# Patient Record
Sex: MALE | Race: WHITE | NOT HISPANIC OR LATINO | Employment: OTHER | URBAN - METROPOLITAN AREA
[De-identification: names, ages, dates, MRNs, and addresses within clinical notes are randomized per-mention and may not be internally consistent; named-entity substitution may affect disease eponyms.]

---

## 2018-08-09 ENCOUNTER — TELEPHONE (OUTPATIENT)
Dept: INTERNAL MEDICINE | Facility: CLINIC | Age: 83
End: 2018-08-09

## 2018-08-10 ENCOUNTER — LAB REQUISITION (OUTPATIENT)
Dept: LAB | Facility: HOSPITAL | Age: 83
End: 2018-08-10

## 2018-08-10 DIAGNOSIS — Z00.00 ROUTINE GENERAL MEDICAL EXAMINATION AT A HEALTH CARE FACILITY: ICD-10-CM

## 2018-08-10 LAB
ALBUMIN SERPL-MCNC: 4.27 G/DL (ref 3.2–4.8)
ALBUMIN/GLOB SERPL: 1.7 G/DL (ref 1.5–2.5)
ALP SERPL-CCNC: 71 U/L (ref 25–100)
ALT SERPL W P-5'-P-CCNC: 24 U/L (ref 7–40)
ANION GAP SERPL CALCULATED.3IONS-SCNC: 10 MMOL/L (ref 3–11)
AST SERPL-CCNC: 29 U/L (ref 0–33)
BACTERIA UR QL AUTO: NORMAL /HPF
BASOPHILS # BLD AUTO: 0.04 10*3/MM3 (ref 0–0.2)
BASOPHILS NFR BLD AUTO: 0.6 % (ref 0–1)
BILIRUB SERPL-MCNC: 0.4 MG/DL (ref 0.3–1.2)
BILIRUB UR QL STRIP: NEGATIVE
BUN BLD-MCNC: 19 MG/DL (ref 9–23)
BUN/CREAT SERPL: 16.5 (ref 7–25)
CALCIUM SPEC-SCNC: 8.7 MG/DL (ref 8.7–10.4)
CHLORIDE SERPL-SCNC: 104 MMOL/L (ref 99–109)
CLARITY UR: CLEAR
CO2 SERPL-SCNC: 28 MMOL/L (ref 20–31)
COLOR UR: YELLOW
CREAT BLD-MCNC: 1.15 MG/DL (ref 0.6–1.3)
DEPRECATED RDW RBC AUTO: 47.6 FL (ref 37–54)
EOSINOPHIL # BLD AUTO: 0.36 10*3/MM3 (ref 0–0.3)
EOSINOPHIL NFR BLD AUTO: 5.3 % (ref 0–3)
ERYTHROCYTE [DISTWIDTH] IN BLOOD BY AUTOMATED COUNT: 14 % (ref 11.3–14.5)
GFR SERPL CREATININE-BSD FRML MDRD: 60 ML/MIN/1.73
GFR SERPL CREATININE-BSD FRML MDRD: 72 ML/MIN/1.73
GLOBULIN UR ELPH-MCNC: 2.5 GM/DL
GLUCOSE BLD-MCNC: 110 MG/DL (ref 70–100)
GLUCOSE UR STRIP-MCNC: NEGATIVE MG/DL
HCT VFR BLD AUTO: 41.8 % (ref 38.9–50.9)
HGB BLD-MCNC: 13.2 G/DL (ref 13.1–17.5)
HGB UR QL STRIP.AUTO: NEGATIVE
HYALINE CASTS UR QL AUTO: NORMAL /LPF
IMM GRANULOCYTES # BLD: 0.02 10*3/MM3 (ref 0–0.03)
IMM GRANULOCYTES NFR BLD: 0.3 % (ref 0–0.6)
KETONES UR QL STRIP: NEGATIVE
LEUKOCYTE ESTERASE UR QL STRIP.AUTO: NEGATIVE
LYMPHOCYTES # BLD AUTO: 1.84 10*3/MM3 (ref 0.6–4.8)
LYMPHOCYTES NFR BLD AUTO: 27.1 % (ref 24–44)
MCH RBC QN AUTO: 29.5 PG (ref 27–31)
MCHC RBC AUTO-ENTMCNC: 31.6 G/DL (ref 32–36)
MCV RBC AUTO: 93.3 FL (ref 80–99)
MONOCYTES # BLD AUTO: 0.51 10*3/MM3 (ref 0–1)
MONOCYTES NFR BLD AUTO: 7.5 % (ref 0–12)
NEUTROPHILS # BLD AUTO: 4.03 10*3/MM3 (ref 1.5–8.3)
NEUTROPHILS NFR BLD AUTO: 59.5 % (ref 41–71)
NITRITE UR QL STRIP: NEGATIVE
PH UR STRIP.AUTO: 5.5 [PH] (ref 5–8)
PLATELET # BLD AUTO: 202 10*3/MM3 (ref 150–450)
PMV BLD AUTO: 10.4 FL (ref 6–12)
POTASSIUM BLD-SCNC: 4.5 MMOL/L (ref 3.5–5.5)
PROT SERPL-MCNC: 6.8 G/DL (ref 5.7–8.2)
PROT UR QL STRIP: NEGATIVE
RBC # BLD AUTO: 4.48 10*6/MM3 (ref 4.2–5.76)
RBC # UR: NORMAL /HPF
REF LAB TEST METHOD: NORMAL
SODIUM BLD-SCNC: 142 MMOL/L (ref 132–146)
SP GR UR STRIP: >=1.03 (ref 1–1.03)
SQUAMOUS #/AREA URNS HPF: NORMAL /HPF
UROBILINOGEN UR QL STRIP: NORMAL
WBC NRBC COR # BLD: 6.78 10*3/MM3 (ref 3.5–10.8)
WBC UR QL AUTO: NORMAL /HPF

## 2018-08-10 PROCEDURE — 81001 URINALYSIS AUTO W/SCOPE: CPT

## 2018-08-10 PROCEDURE — 80053 COMPREHEN METABOLIC PANEL: CPT

## 2018-08-10 PROCEDURE — 85025 COMPLETE CBC W/AUTO DIFF WBC: CPT

## 2018-08-16 ENCOUNTER — APPOINTMENT (OUTPATIENT)
Dept: GENERAL RADIOLOGY | Facility: HOSPITAL | Age: 83
End: 2018-08-16

## 2018-08-16 ENCOUNTER — APPOINTMENT (OUTPATIENT)
Dept: CT IMAGING | Facility: HOSPITAL | Age: 83
End: 2018-08-16

## 2018-08-16 ENCOUNTER — HOSPITAL ENCOUNTER (EMERGENCY)
Facility: HOSPITAL | Age: 83
Discharge: HOME | End: 2018-08-16
Attending: EMERGENCY MEDICINE | Admitting: EMERGENCY MEDICINE

## 2018-08-16 VITALS
WEIGHT: 190 LBS | SYSTOLIC BLOOD PRESSURE: 153 MMHG | HEIGHT: 72 IN | TEMPERATURE: 97.7 F | DIASTOLIC BLOOD PRESSURE: 68 MMHG | RESPIRATION RATE: 18 BRPM | HEART RATE: 64 BPM | OXYGEN SATURATION: 95 % | BODY MASS INDEX: 25.73 KG/M2

## 2018-08-16 DIAGNOSIS — S00.511A: ICD-10-CM

## 2018-08-16 DIAGNOSIS — S01.21XA LACERATION OF NOSE, INITIAL ENCOUNTER: ICD-10-CM

## 2018-08-16 DIAGNOSIS — S42.475A CLOSED NONDISPLACED TRANSCONDYLAR FRACTURE OF LEFT HUMERUS, INITIAL ENCOUNTER: Primary | ICD-10-CM
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
## 2018-08-16 LAB
BUN BLDA-MCNC: 18 MG/DL (ref 8–26)
CA-I BLDA-SCNC: 1 MMOL/L (ref 1.2–1.32)
CHLORIDE BLDA-SCNC: 103 MMOL/L (ref 98–109)
CO2 BLDA-SCNC: 21 MMOL/L (ref 24–29)
CREAT BLDA-MCNC: 0.6 MG/DL (ref 0.6–1.3)
GLUCOSE BLDC GLUCOMTR-MCNC: 85 MG/DL (ref 70–130)
HCT VFR BLDA CALC: 34 % (ref 38–51)
HGB BLDA-MCNC: 11.6 G/DL (ref 12–17)
POTASSIUM BLDA-SCNC: 3.5 MMOL/L (ref 3.5–4.9)
SODIUM BLDA-SCNC: 141 MMOL/L (ref 138–146)

## 2018-08-16 PROCEDURE — 70486 CT MAXILLOFACIAL W/O DYE: CPT

## 2018-08-16 PROCEDURE — 85014 HEMATOCRIT: CPT

## 2018-08-16 PROCEDURE — 73090 X-RAY EXAM OF FOREARM: CPT

## 2018-08-16 PROCEDURE — 72125 CT NECK SPINE W/O DYE: CPT

## 2018-08-16 PROCEDURE — 80047 BASIC METABLC PNL IONIZED CA: CPT

## 2018-08-16 PROCEDURE — 70450 CT HEAD/BRAIN W/O DYE: CPT

## 2018-08-16 PROCEDURE — 99284 EMERGENCY DEPT VISIT MOD MDM: CPT

## 2018-08-16 PROCEDURE — 73060 X-RAY EXAM OF HUMERUS: CPT

## 2018-08-16 RX ORDER — LORAZEPAM 0.5 MG/1
0.5 TABLET ORAL NIGHTLY PRN
COMMUNITY
End: 2019-02-20

## 2018-08-16 RX ORDER — ACETAMINOPHEN 325 MG/1
650 TABLET ORAL EVERY 4 HOURS PRN
COMMUNITY

## 2018-08-16 RX ORDER — BACITRACIN, NEOMYCIN, POLYMYXIN B 400; 3.5; 5 [USP'U]/G; MG/G; [USP'U]/G
1 OINTMENT TOPICAL ONCE
Status: COMPLETED | OUTPATIENT
Start: 2018-08-16 | End: 2018-08-16

## 2018-08-16 RX ORDER — DOCUSATE SODIUM 100 MG/1
100 CAPSULE, LIQUID FILLED ORAL 2 TIMES DAILY
COMMUNITY
End: 2019-02-20

## 2018-08-16 RX ADMIN — BACITRACIN, NEOMYCIN, POLYMYXIN B 1 G: 400; 3.5; 5 OINTMENT TOPICAL at 20:41

## 2018-08-16 NOTE — ED PROVIDER NOTES
Nafisa Flores is a 89 y.o. male who presents to the ED s/p fall. EMS reports that they were called by the [facility] after the patient fell face first. As a result of this fall he sustained a laceration to the bridge of his nose. EMS also notes that he seemed to be favoring his left upper extremity while en route to the ED. There was no loss of consciousness. The patient has a hx of dementia and is confused and requires assistance to walk at baseline. He was sent to the [facility] by his nursing home due to combative behavior. There are no other acute complaints at this time.        History provided by:  EMS personnel  History limited by:  Dementia  Fall   Mechanism of injury: fall    Injury location:  Face  Facial injury location:  Nose and lower lip  Incident location: [facility].  Arrived directly from scene: yes    Fall:     Fall occurred:  Unable to specify    Impact surface:  Unable to specify    Point of impact:  Face  Suspicion of alcohol use: no    Suspicion of drug use: no    Current pain details:     Pain quality:  Unable to specify    Pain Severity:  Unable to specify    Pain timing:  Constant  Associated symptoms: no loss of consciousness        Review of Systems   Unable to perform ROS: Dementia   Musculoskeletal:        LUE pain   Skin:        Nose laceration and lip abrasion   Neurological: Negative for loss of consciousness.       Past Medical History:   Diagnosis Date   • Dementia        No Known Allergies    History reviewed. No pertinent surgical history.    History reviewed. No pertinent family history.    Social History     Social History   • Marital status:      Social History Main Topics   • Smoking status: Unknown If Ever Smoked   • Alcohol use Defer   • Drug use: Unknown     Other Topics Concern   • Not on file         Objective   Physical Exam   Constitutional: He appears well-developed and well-nourished. No distress.   Silent older male with little spontaneous movements. No  conversation. He follows simple commands and smells of feces.   HENT:   Head: Normocephalic.   Nose: Nose normal.   Airway patent. He has dentures. There is a small laceration to the bridge of his nose with dry blood on his face most likely due to this injury. Small abrasion to his lower lip that is sensitive to touch.    Eyes: Conjunctivae are normal. No scleral icterus.   Pupils are small and equal   Neck:   Patient is in a c collar    Cardiovascular: Regular rhythm and normal heart sounds.  Bradycardia present.    No murmur heard.  Pulses:       Radial pulses are 2+ on the left side.   Pulmonary/Chest: Effort normal and breath sounds normal. No respiratory distress. He exhibits no tenderness.   No chest wall bruising. No obvious rib tenderness.   Abdominal: Soft. There is no tenderness.   Musculoskeletal: He exhibits no tenderness.   No obvious pain to his BLE. There is pain with movement of the LUE but there are no obvious signs of trauma.    Neurological:    are strong. He is slow to follow commands and is not speaking. Cannot evaluate sensory or motor function   Skin: Skin is warm and dry.   Psychiatric: He has a normal mood and affect. His behavior is normal.   Nursing note and vitals reviewed.      Procedures         ED Course  ED Course as of Aug 16 1934   Thu Aug 16, 2018   1708 Paged Dr. Cage, orthopedist.   [DT]   1711 Dr. Snyder discussed the case with Dr. Cage, orthopedist, who says to put the patient in a splint  [DT]      ED Course User Index  [DT] James Cox     Recent Results (from the past 24 hour(s))   POC CHEM 8    Collection Time: 08/16/18  4:56 PM   Result Value Ref Range    Glucose 85 70 - 130 mg/dL    BUN 18 8 - 26 mg/dL    Creatinine 0.60 0.60 - 1.30 mg/dL    Sodium 141 138 - 146 mmol/L    Potassium 3.5 3.5 - 4.9 mmol/L    Chloride 103 98 - 109 mmol/L    Total CO2 21 (L) 24 - 29 mmol/L    Hemoglobin 11.6 (L) 12.0 - 17.0 g/dL    Hematocrit 34 (L) 38 - 51 %    Ionized Calcium 1.00  (L) 1.20 - 1.32 mmol/L     Note: In addition to lab results from this visit, the labs listed above may include labs taken at another facility or during a different encounter within the last 24 hours. Please correlate lab times with ED admission and discharge times for further clarification of the services performed during this visit.    CT Head Without Contrast   Final Result   1. No acute intracranial abnormality.    2. No acute facial bone fracture.   3. No acute cervical spine fracture.        DICTATED:   8/16/2018   EDITED/ls :   8/16/2018        This report was finalized on 8/16/2018 6:56 PM by Eduardo Portillo.          CT Cervical Spine Without Contrast   Final Result   1. No acute intracranial abnormality.    2. No acute facial bone fracture.   3. No acute cervical spine fracture.        DICTATED:   8/16/2018   EDITED/ls :   8/16/2018        This report was finalized on 8/16/2018 6:56 PM by Eduardo Portillo.          CT Facial Bones Without Contrast   Final Result   1. No acute intracranial abnormality.    2. No acute facial bone fracture.   3. No acute cervical spine fracture.        DICTATED:   8/16/2018   EDITED/ls :   8/16/2018        This report was finalized on 8/16/2018 6:56 PM by Eduardo Portillo.          XR Forearm 2 View Left   Final Result   There are degenerative changes of the elbow and a small   metallic radiopaque foreign body is noted in the region of the wrist as   described above.           D:  08/16/2018   E:  08/16/2018       This report was finalized on 8/16/2018 4:59 PM by Dr. Brian Jasmine MD.          XR Humerus Left   Final Result   Intercondylar fracture of the distal humerus without   dislocation or displacement.       D:  08/16/2018   E:  08/16/2018       This report was finalized on 8/16/2018 4:59 PM by Dr. Brian Jasmine MD.            Vitals:    08/16/18 1619 08/16/18 1731 08/16/18 1817 08/16/18 1818   BP: 160/71 153/76 159/58    BP Location: Right arm      Patient Position: Lying      Pulse:  "62 64     Resp: 16 18     Temp: 97.7 °F (36.5 °C)      TempSrc: Axillary      SpO2: 97% 97%  95%   Weight: 86.2 kg (190 lb)      Height: 182.9 cm (72\")        Medications   neomycin-bacitracin-polymyxin (NEOSPORIN) ointment 1 g (not administered)     ECG/EMG Results (last 24 hours)     ** No results found for the last 24 hours. **                        [facility]    Final diagnoses:   Closed nondisplaced transcondylar fracture of left humerus, initial encounter   Laceration of nose, initial encounter   Abrasion of vermilion border of lower lip, initial encounter   Fall, initial encounter       Documentation assistance provided by efe Cox.  Information recorded by the scribe was done at my direction and has been verified and validated by me.     James Cox  08/16/18 1650       James Cox  08/16/18 1715       James Cox  08/16/18 1928       James Cox  08/1934       Castillo Snyder MD  08/17/18 0205    "

## 2018-08-16 NOTE — DISCHARGE INSTRUCTIONS
Follow with Dr. Cage at the next available time. Call and make an appointment    Apply antibiotic ointment to lip and bridge of nose twice daily    Continue behavioral treatment at the [facility]    Continue to avoid falls    Immediately return to the emergency department for any new or worsening symptoms.

## 2018-08-31 ENCOUNTER — OFFICE VISIT (OUTPATIENT)
Dept: ORTHOPEDIC SURGERY | Facility: CLINIC | Age: 83
End: 2018-08-31

## 2018-08-31 VITALS — WEIGHT: 190 LBS | HEIGHT: 72 IN | HEART RATE: 82 BPM | BODY MASS INDEX: 25.73 KG/M2 | OXYGEN SATURATION: 98 %

## 2018-08-31 DIAGNOSIS — F02.81 ALZHEIMER'S DEMENTIA WITH BEHAVIORAL DISTURBANCE, UNSPECIFIED TIMING OF DEMENTIA ONSET: ICD-10-CM

## 2018-08-31 DIAGNOSIS — G30.9 ALZHEIMER'S DEMENTIA WITH BEHAVIORAL DISTURBANCE, UNSPECIFIED TIMING OF DEMENTIA ONSET: ICD-10-CM

## 2018-08-31 DIAGNOSIS — S42.422A CLOSED DISPLACED COMMINUTED SUPRACONDYLAR FRACTURE OF LEFT HUMERUS WITHOUT INTERCONDYLAR FRACTURE, INITIAL ENCOUNTER: ICD-10-CM

## 2018-08-31 DIAGNOSIS — M25.522 LEFT ELBOW PAIN: Primary | ICD-10-CM

## 2018-08-31 PROCEDURE — 99204 OFFICE O/P NEW MOD 45 MIN: CPT | Performed by: ORTHOPAEDIC SURGERY

## 2018-08-31 PROCEDURE — 24530 CLTX SPRCNDYLR HUMERAL FX WO: CPT | Performed by: ORTHOPAEDIC SURGERY

## 2018-08-31 RX ORDER — METOPROLOL TARTRATE 50 MG/1
50 TABLET, FILM COATED ORAL DAILY
Status: ON HOLD | COMMUNITY
End: 2019-03-23 | Stop reason: SDUPTHER

## 2018-08-31 RX ORDER — SERTRALINE HYDROCHLORIDE 25 MG/1
25 TABLET, FILM COATED ORAL DAILY
COMMUNITY

## 2018-08-31 RX ORDER — AMLODIPINE BESYLATE 5 MG/1
5 TABLET ORAL DAILY
COMMUNITY
End: 2019-03-23 | Stop reason: HOSPADM

## 2018-08-31 RX ORDER — SIMVASTATIN 20 MG
20 TABLET ORAL EVERY MORNING
COMMUNITY

## 2018-08-31 RX ORDER — DONEPEZIL HYDROCHLORIDE 10 MG/1
10 TABLET, FILM COATED ORAL NIGHTLY
COMMUNITY

## 2018-08-31 RX ORDER — ASPIRIN 81 MG/1
81 TABLET ORAL DAILY
COMMUNITY

## 2018-09-28 ENCOUNTER — OFFICE VISIT (OUTPATIENT)
Dept: ORTHOPEDIC SURGERY | Facility: CLINIC | Age: 83
End: 2018-09-28

## 2018-09-28 VITALS — HEART RATE: 82 BPM | WEIGHT: 190.04 LBS | OXYGEN SATURATION: 82 % | BODY MASS INDEX: 25.74 KG/M2 | HEIGHT: 72 IN

## 2018-09-28 DIAGNOSIS — F02.81 ALZHEIMER'S DEMENTIA WITH BEHAVIORAL DISTURBANCE, UNSPECIFIED TIMING OF DEMENTIA ONSET: ICD-10-CM

## 2018-09-28 DIAGNOSIS — G30.9 ALZHEIMER'S DEMENTIA WITH BEHAVIORAL DISTURBANCE, UNSPECIFIED TIMING OF DEMENTIA ONSET: ICD-10-CM

## 2018-09-28 DIAGNOSIS — Z09 FRACTURE FOLLOW-UP: Primary | ICD-10-CM

## 2018-09-28 DIAGNOSIS — S42.422D CLOSED DISPLACED COMMINUTED SUPRACONDYLAR FRACTURE OF LEFT HUMERUS WITHOUT INTERCONDYLAR FRACTURE WITH ROUTINE HEALING, SUBSEQUENT ENCOUNTER: ICD-10-CM

## 2018-09-28 PROCEDURE — 99024 POSTOP FOLLOW-UP VISIT: CPT | Performed by: ORTHOPAEDIC SURGERY

## 2018-09-28 RX ORDER — LOSARTAN POTASSIUM 50 MG/1
TABLET ORAL
COMMUNITY
Start: 2018-09-18 | End: 2019-02-20

## 2018-09-28 NOTE — PROGRESS NOTES
[facility] Note    Subjective     Chief Complaint   Patient presents with   • Left Elbow - Follow-up     1  Month, Closed displaced comminuted supracondylar fracture of left humerus without intercondylar fracture        HPI      Tee Flores is a 89 y.o. male.  He is follow-up left elbow fracture from over 6 weeks ago.  He has severe Alzheimer's dementia.  He's here with a caregiver.  He came out of the splint today.  History was obtained from his caregiver as he is severely demented and unsure as to why he is here.        Past Medical History:   Diagnosis Date   • Alzheimer disease    • Atherosclerosis of coronary artery bypass graft    • Cerebral infarction (CMS/HCC)    • Dementia    • Hyperlipidemia    • Hypertension       History reviewed. No pertinent surgical history.   History reviewed. No pertinent family history.  Social History     Social History   • Marital status:      Spouse name: N/A   • Number of children: N/A   • Years of education: N/A     Occupational History   • Not on file.     Social History Main Topics   • Smoking status: Former Smoker     Packs/day: 2.00   • Smokeless tobacco: Never Used   • Alcohol use No   • Drug use: No   • Sexual activity: Defer     Other Topics Concern   • Not on file     Social History Narrative   • No narrative on file      Current Outpatient Prescriptions on File Prior to Visit   Medication Sig Dispense Refill   • acetaminophen (TYLENOL) 325 MG tablet Take 650 mg by mouth Every 4 (Four) Hours As Needed for Mild Pain .     • amLODIPine (NORVASC) 5 MG tablet Take 5 mg by mouth Daily.     • aspirin 81 MG EC tablet Take 81 mg by mouth Daily.     • bismuth subsalicylate (PEPTO BISMOL) 262 MG/15ML suspension Take 30 mL by mouth Every 4 (Four) Hours As Needed for Indigestion.     • docusate sodium (COLACE) 100 MG capsule Take 100 mg by mouth 2 (Two) Times a Day.     • donepezil (ARICEPT) 10 MG tablet Take 10 mg by mouth Every Night.     •  "LORazepam (ATIVAN) 0.5 MG tablet Take 0.5 mg by mouth At Night As Needed for Anxiety.     • magnesium hydroxide (MILK OF MAGNESIA) 2400 MG/10ML suspension suspension Take 10 mL by mouth Daily As Needed.     • metoprolol tartrate (LOPRESSOR) 50 MG tablet Take 50 mg by mouth Daily.     • sertraline (ZOLOFT) 25 MG tablet Take 25 mg by mouth As Needed.     • simvastatin (ZOCOR) 20 MG tablet Take 20 mg by mouth Daily.       No current facility-administered medications on file prior to visit.       No Known Allergies     The following portions of the patient's history were reviewed and updated as appropriate: allergies, current medications, past family history, past medical history, past social history, past surgical history and problem list.    Review of Systems   Constitutional: Negative.    HENT: Negative.    Eyes: Negative.    Respiratory: Negative.    Cardiovascular: Negative.    Gastrointestinal: Negative.    Endocrine: Negative.    Genitourinary: Negative.    Musculoskeletal: Positive for arthralgias.   Skin: Negative.    Allergic/Immunologic: Negative.    Neurological: Negative.    Hematological: Negative.    Psychiatric/Behavioral: Negative.         Objective      Physical Exam  Pulse 82   Ht 182.9 cm (72.01\")   Wt 86.2 kg (190 lb 0.6 oz)   SpO2 (!) 82%   BMI 25.77 kg/m²     Body mass index is 25.77 kg/m².        GENERAL APPEARANCE: awake, alert & disoriented, in no acute distress and well developed, well nourished  PSYCH: normal mood and affect        Ortho Exam  His left elbow skin is intact.  He is neurovascular intact.  He is quite stiff with limited range of motion.  His range of motion is from 45-95°.    Imaging/Studies  Imaging Results (last 7 days)     Procedure Component Value Units Date/Time    XR Elbow 3+ View Left [[id number]] Resulted:  09/28/18 1115     Updated:  09/28/18 1115    Narrative:       Left Elbow X-Ray  Indication: Pain  Views: AP, oblique and Lateral views    Findings:  Healing of " supracondylar humerus fracture  No bony lesion  Normal soft tissues  Normal joint spaces    prior studies were available for comparison.                  Assessment/Plan        ICD-10-CM ICD-9-CM   1. Fracture follow-up Z09 V67.4   2. Closed displaced comminuted supracondylar fracture of left humerus without intercondylar fracture with routine healing, subsequent encounter S42.422D V54.11   3. Alzheimer's dementia with behavioral disturbance, unspecified timing of dementia onset G30.8 331.0    F02.81 294.11     I wrote for physical therapy for him to do range of motion left elbow.  He'll follow-up month with x-rays.  He is here with his caregiver.  Medical Decision Making  Management Options : over-the-counter medicine, physical/occupational therapy and close treatment of fracture or dislocation  Data/Risk: radiology tests, decision to obtain history from someone other than the patient and independent visualization of imaging, lab tests, or EMG/NCV    Cornell Cage MD  09/28/18  11:19 AM         EMR Dragon/Transcription disclaimer:  Much of this encounter note is an electronic transcription of spoken language to printed text. Electronic transcription of spoken language may permit erroneous, or at times, nonsensical words or phrases to be inadvertently transcribed. Although I have reviewed the note for such errors, some may still exist.

## 2018-10-05 ENCOUNTER — TELEPHONE (OUTPATIENT)
Dept: INTERNAL MEDICINE | Facility: CLINIC | Age: 83
End: 2018-10-05

## 2018-10-26 ENCOUNTER — OFFICE VISIT (OUTPATIENT)
Dept: ORTHOPEDIC SURGERY | Facility: CLINIC | Age: 83
End: 2018-10-26

## 2018-10-26 VITALS — OXYGEN SATURATION: 98 % | HEART RATE: 50 BPM | BODY MASS INDEX: 25.74 KG/M2 | WEIGHT: 190.04 LBS | HEIGHT: 72 IN

## 2018-10-26 DIAGNOSIS — Z09 FRACTURE FOLLOW-UP: Primary | ICD-10-CM

## 2018-10-26 DIAGNOSIS — S42.422D CLOSED DISPLACED COMMINUTED SUPRACONDYLAR FRACTURE OF LEFT HUMERUS WITHOUT INTERCONDYLAR FRACTURE WITH ROUTINE HEALING, SUBSEQUENT ENCOUNTER: ICD-10-CM

## 2018-10-26 PROCEDURE — 99024 POSTOP FOLLOW-UP VISIT: CPT | Performed by: ORTHOPAEDIC SURGERY

## 2018-10-26 RX ORDER — RISPERIDONE 0.25 MG/1
0.25 TABLET ORAL 2 TIMES DAILY
COMMUNITY
Start: 2018-09-28 | End: 2019-03-01 | Stop reason: HOSPADM

## 2018-10-26 NOTE — PROGRESS NOTES
[facility]    Subjective     Chief Complaint   Patient presents with   • Left Elbow - Follow-up     1  Month, Closed displaced comminuted supracondylar fracture of left humerus without intercondylar fracture        HPI      Tee Flores is a 89 y.o. male.  He has severe Alzheimer's dementia and is not able to lyse here.  He has no complaints.  He is here with a caregiver.  He is seated in a wheelchair.        Past Medical History:   Diagnosis Date   • Alzheimer disease    • Atherosclerosis of coronary artery bypass graft    • Cerebral infarction (CMS/HCC)    • Dementia    • Hyperlipidemia    • Hypertension       History reviewed. No pertinent surgical history.   Family History   Problem Relation Age of Onset   • Family history unknown: Yes     Social History     Social History   • Marital status:      Spouse name: N/A   • Number of children: N/A   • Years of education: N/A     Occupational History   • Not on file.     Social History Main Topics   • Smoking status: Former Smoker     Packs/day: 2.00   • Smokeless tobacco: Never Used   • Alcohol use No   • Drug use: No   • Sexual activity: Defer     Other Topics Concern   • Not on file     Social History Narrative   • No narrative on file      Current Outpatient Prescriptions on File Prior to Visit   Medication Sig Dispense Refill   • acetaminophen (TYLENOL) 325 MG tablet Take 650 mg by mouth Every 4 (Four) Hours As Needed for Mild Pain .     • amLODIPine (NORVASC) 5 MG tablet Take 5 mg by mouth Daily.     • aspirin 81 MG EC tablet Take 81 mg by mouth Daily.     • bismuth subsalicylate (PEPTO BISMOL) 262 MG/15ML suspension Take 30 mL by mouth Every 4 (Four) Hours As Needed for Indigestion.     • cimetidine (TAGAMET) 200 MG tablet      • docusate sodium (COLACE) 100 MG capsule Take 100 mg by mouth 2 (Two) Times a Day.     • donepezil (ARICEPT) 10 MG tablet Take 10 mg by mouth Every Night.     • LORazepam (ATIVAN) 0.5 MG tablet  "Take 0.5 mg by mouth At Night As Needed for Anxiety.     • losartan (COZAAR) 50 MG tablet      • magnesium hydroxide (MILK OF MAGNESIA) 2400 MG/10ML suspension suspension Take 10 mL by mouth Daily As Needed.     • metoprolol tartrate (LOPRESSOR) 50 MG tablet Take 50 mg by mouth Daily.     • sertraline (ZOLOFT) 25 MG tablet Take 25 mg by mouth As Needed.     • simvastatin (ZOCOR) 20 MG tablet Take 20 mg by mouth Daily.       No current facility-administered medications on file prior to visit.       No Known Allergies     The following portions of the patient's history were reviewed and updated as appropriate: allergies, current medications, past family history, past medical history, past social history, past surgical history and problem list.    Review of Systems   Constitutional: Negative.    HENT: Negative.    Eyes: Negative.    Respiratory: Negative.    Cardiovascular: Negative.    Gastrointestinal: Negative.    Endocrine: Negative.    Genitourinary: Negative.    Musculoskeletal: Positive for arthralgias.   Skin: Negative.    Allergic/Immunologic: Negative.    Neurological: Negative.    Hematological: Negative.    Psychiatric/Behavioral: Negative.         Objective      Physical Exam  Pulse 50   Ht 182.9 cm (72.01\")   Wt 86.2 kg (190 lb 0.6 oz)   SpO2 98%   BMI 25.77 kg/m²     Body mass index is 25.77 kg/m².        GENERAL APPEARANCE: awake, alert & oriented x 3, in no acute distress and well developed, well nourished  PSYCH: normal mood and affect  LUNGS:  breathing nonlabored, no wheezing  EYES: sclera anicteric, pupils equal  CARDIOVASCULAR: palpable pulses dorsalis pedis, palpable posterior tibial bilaterally. Capillary refill less than 2 seconds  INTEGUMENTARY: skin intact, no clubbing, cyanosis  NEUROLOGIC:  Normal Sensation and reflexes             Ortho Exam  His left elbow has no pain or tenderness.    Imaging/Studies  Imaging Results (last 7 days)     Procedure Component Value Units Date/Time    XR " Elbow 3+ View Left [[id number]] Resulted:  10/26/18 1143     Updated:  10/26/18 1144    Narrative:       Left Elbow X-Ray  Indication: Pain  Views: AP, oblique and Lateral views    Findings:  Healing of left elbow fracture with slight malunion  No bony lesion  Normal soft tissues  Normal joint spaces     prior studies were available for comparison.                  Assessment/Plan        ICD-10-CM ICD-9-CM   1. Fracture follow-up Z09 V67.4   2. Closed displaced comminuted supracondylar fracture of left humerus without intercondylar fracture with routine healing, subsequent encounter S42.422D V54.11       Orders Placed This Encounter   Procedures   • XR Elbow 3+ View Left    He is activity as tolerated.  He will follow-up as needed.    Medical Decision Making  Management Options : over-the-counter medicine      Cornell Cage MD  10/26/18  11:46 AM         EMR Dragon/Transcription disclaimer:  Much of this encounter note is an electronic transcription of spoken language to printed text. Electronic transcription of spoken language may permit erroneous, or at times, nonsensical words or phrases to be inadvertently transcribed. Although I have reviewed the note for such errors, some may still exist.

## 2018-11-12 ENCOUNTER — TELEPHONE (OUTPATIENT)
Dept: INTERNAL MEDICINE | Facility: CLINIC | Age: 83
End: 2018-11-12

## 2018-11-14 ENCOUNTER — TELEPHONE (OUTPATIENT)
Dept: INTERNAL MEDICINE | Facility: CLINIC | Age: 83
End: 2018-11-14

## 2019-02-09 ENCOUNTER — HOSPITAL ENCOUNTER (EMERGENCY)
Facility: HOSPITAL | Age: 84
Discharge: NURSING, REHAB OR LONG-TERM CARE | End: 2019-02-09
Attending: EMERGENCY MEDICINE | Admitting: EMERGENCY MEDICINE

## 2019-02-09 ENCOUNTER — APPOINTMENT (OUTPATIENT)
Dept: CT IMAGING | Facility: HOSPITAL | Age: 84
End: 2019-02-09

## 2019-02-09 VITALS
HEIGHT: 72 IN | TEMPERATURE: 97.7 F | SYSTOLIC BLOOD PRESSURE: 137 MMHG | HEART RATE: 47 BPM | RESPIRATION RATE: 16 BRPM | OXYGEN SATURATION: 95 % | DIASTOLIC BLOOD PRESSURE: 57 MMHG | BODY MASS INDEX: 23.46 KG/M2 | WEIGHT: 173.2 LBS

## 2019-02-09 DIAGNOSIS — M54.50 ACUTE MIDLINE LOW BACK PAIN WITHOUT SCIATICA: Primary | ICD-10-CM

## 2019-02-09 PROCEDURE — 72128 CT CHEST SPINE W/O DYE: CPT

## 2019-02-09 PROCEDURE — 72131 CT LUMBAR SPINE W/O DYE: CPT

## 2019-02-09 PROCEDURE — 72125 CT NECK SPINE W/O DYE: CPT

## 2019-02-09 PROCEDURE — 99283 EMERGENCY DEPT VISIT LOW MDM: CPT

## 2019-02-09 RX ORDER — ERGOCALCIFEROL 1.25 MG/1
50000 CAPSULE ORAL WEEKLY
COMMUNITY

## 2019-02-09 NOTE — ED PROVIDER NOTES
Subjective   Tee Flores is a 89 y.o.male with a history of Alzheimerwho presents to the ED via EMS with complaints of back pain s/p fall. EMS reported that the patient fell 2 days ago and received X rays that were unremarkable. They state that he is now complaining of severe back pain. However, when the patient was asked if he has back pain or any other back pain he denies it. The patient intermittently speaks incomprehensibly and his history and review of systems are not possible secondary to his dementia.        History provided by:  Patient and EMS personnel  History limited by:  Dementia  Back Pain   Location:  Lumbar spine  Quality:  Unable to specify  Pain severity:  Unable to specify  Pain is:  Unable to specify  Onset quality:  Sudden  Duration:  2 days  Timing:  Unable to specify  Progression:  Unable to specify  Chronicity:  New  Context: falling        Review of Systems   Unable to perform ROS: Dementia   Musculoskeletal: Positive for back pain.       Past Medical History:   Diagnosis Date   • Alzheimer disease    • Atherosclerosis of coronary artery bypass graft    • Cerebral infarction (CMS/HCC)    • Dementia    • Hyperlipidemia    • Hypertension        No Known Allergies    History reviewed. No pertinent surgical history.    Family History   Family history unknown: Yes       Social History     Socioeconomic History   • Marital status:      Spouse name: Not on file   • Number of children: Not on file   • Years of education: Not on file   • Highest education level: Not on file   Tobacco Use   • Smoking status: Former Smoker     Packs/day: 2.00   • Smokeless tobacco: Never Used   Substance and Sexual Activity   • Alcohol use: No   • Drug use: No   • Sexual activity: Defer         Objective   Physical Exam   Constitutional: He appears well-developed and well-nourished. No distress.   HENT:   Head: Normocephalic and atraumatic.   Nose: Nose normal.   Eyes: Conjunctivae are normal. No scleral  icterus.   Neck: Normal range of motion. Neck supple.   Cardiovascular: Normal rate, regular rhythm and normal heart sounds.   No murmur heard.  Pulmonary/Chest: Effort normal and breath sounds normal. No respiratory distress.   Abdominal: Soft. There is no tenderness.   Musculoskeletal: Normal range of motion. He exhibits no edema or tenderness.   He has no back tenderness and no spasms or deformity. He has normal range of motion of the legs without pain in the hips or knees. He has no neck tenderness.  Scalp nontender.   Neurological: He is alert.   Moves all fours.  No apparent weakness.   Skin: Skin is warm and dry.   Nursing note and vitals reviewed.      Procedures         ED Course     CT of the entire spine is negative for acute process.  Pt has no complaints on rechecks.  Family arrived, discussed with them, they were given same story we were of patient yelling about back pain.  He has no evidence of discomfort here.                 MDM  Number of Diagnoses or Management Options  Acute midline low back pain without sciatica:      Amount and/or Complexity of Data Reviewed  Tests in the radiology section of CPT®: reviewed and ordered  Independent visualization of images, tracings, or specimens: yes        Final diagnoses:   Acute midline low back pain without sciatica       Documentation assistance provided by efe Carter.  Information recorded by the efe was done at my direction and has been verified and validated by me.     Gerber Carter  02/09/19 1236       Gerber Carter  02/09/19 1506       Quentin Harmon MD  02/09/19 [id number]

## 2019-02-20 ENCOUNTER — APPOINTMENT (OUTPATIENT)
Dept: CT IMAGING | Facility: HOSPITAL | Age: 84
End: 2019-02-20

## 2019-02-20 ENCOUNTER — APPOINTMENT (OUTPATIENT)
Dept: GENERAL RADIOLOGY | Facility: HOSPITAL | Age: 84
End: 2019-02-20

## 2019-02-20 ENCOUNTER — ANESTHESIA (OUTPATIENT)
Dept: PERIOP | Facility: HOSPITAL | Age: 84
End: 2019-02-20

## 2019-02-20 ENCOUNTER — HOSPITAL ENCOUNTER (INPATIENT)
Facility: HOSPITAL | Age: 84
LOS: 9 days | Discharge: NURSING, REHAB OR LONG-TERM CARE | End: 2019-03-01
Attending: EMERGENCY MEDICINE | Admitting: ORTHOPAEDIC SURGERY

## 2019-02-20 ENCOUNTER — ANESTHESIA EVENT (OUTPATIENT)
Dept: PERIOP | Facility: HOSPITAL | Age: 84
End: 2019-02-20

## 2019-02-20 ENCOUNTER — ANESTHESIA EVENT (OUTPATIENT)
Dept: EMERGENCY DEPT | Facility: HOSPITAL | Age: 84
End: 2019-02-20

## 2019-02-20 ENCOUNTER — ANESTHESIA (OUTPATIENT)
Dept: EMERGENCY DEPT | Facility: HOSPITAL | Age: 84
End: 2019-02-20

## 2019-02-20 DIAGNOSIS — S72.002A CLOSED FRACTURE OF LEFT HIP, INITIAL ENCOUNTER (HCC): Primary | ICD-10-CM

## 2019-02-20 DIAGNOSIS — R13.11 ORAL PHASE DYSPHAGIA: ICD-10-CM

## 2019-02-20 DIAGNOSIS — Z78.9 IMPAIRED MOBILITY AND ADLS: ICD-10-CM

## 2019-02-20 DIAGNOSIS — Z74.09 IMPAIRED MOBILITY AND ADLS: ICD-10-CM

## 2019-02-20 DIAGNOSIS — Z74.09 IMPAIRED FUNCTIONAL MOBILITY, BALANCE, GAIT, AND ENDURANCE: ICD-10-CM

## 2019-02-20 PROBLEM — I10 HYPERTENSION: Status: ACTIVE | Noted: 2019-02-20

## 2019-02-20 PROBLEM — F03.90 DEMENTIA (HCC): Status: ACTIVE | Noted: 2019-02-20

## 2019-02-20 LAB
ABO GROUP BLD: NORMAL
ABO GROUP BLD: NORMAL
ALBUMIN SERPL-MCNC: 4.46 G/DL (ref 3.2–4.8)
ALBUMIN/GLOB SERPL: 1.7 G/DL (ref 1.5–2.5)
ALP SERPL-CCNC: 91 U/L (ref 25–100)
ALT SERPL W P-5'-P-CCNC: 17 U/L (ref 7–40)
ANION GAP SERPL CALCULATED.3IONS-SCNC: 7 MMOL/L (ref 3–11)
AST SERPL-CCNC: 22 U/L (ref 0–33)
BACTERIA UR QL AUTO: ABNORMAL /HPF
BASOPHILS # BLD AUTO: 0.02 10*3/MM3 (ref 0–0.2)
BASOPHILS NFR BLD AUTO: 0.2 % (ref 0–1)
BILIRUB SERPL-MCNC: 1.1 MG/DL (ref 0.3–1.2)
BILIRUB UR QL STRIP: NEGATIVE
BLD GP AB SCN SERPL QL: NEGATIVE
BUN BLD-MCNC: 15 MG/DL (ref 9–23)
BUN/CREAT SERPL: 14.4 (ref 7–25)
CALCIUM SPEC-SCNC: 9.2 MG/DL (ref 8.7–10.4)
CHLORIDE SERPL-SCNC: 105 MMOL/L (ref 99–109)
CLARITY UR: CLEAR
CO2 SERPL-SCNC: 26 MMOL/L (ref 20–31)
COLOR UR: YELLOW
CREAT BLD-MCNC: 1.04 MG/DL (ref 0.6–1.3)
DEPRECATED RDW RBC AUTO: 48.4 FL (ref 37–54)
EOSINOPHIL # BLD AUTO: 0.34 10*3/MM3 (ref 0–0.3)
EOSINOPHIL NFR BLD AUTO: 3 % (ref 0–3)
ERYTHROCYTE [DISTWIDTH] IN BLOOD BY AUTOMATED COUNT: 13.9 % (ref 11.3–14.5)
GFR SERPL CREATININE-BSD FRML MDRD: 67 ML/MIN/1.73
GLOBULIN UR ELPH-MCNC: 2.6 GM/DL
GLUCOSE BLD-MCNC: 123 MG/DL (ref 70–100)
GLUCOSE UR STRIP-MCNC: NEGATIVE MG/DL
HCT VFR BLD AUTO: 41.8 % (ref 38.9–50.9)
HGB BLD-MCNC: 13.3 G/DL (ref 13.1–17.5)
HGB UR QL STRIP.AUTO: ABNORMAL
HOLD SPECIMEN: NORMAL
HOLD SPECIMEN: NORMAL
HYALINE CASTS UR QL AUTO: ABNORMAL /LPF
IMM GRANULOCYTES # BLD AUTO: 0.02 10*3/MM3 (ref 0–0.05)
IMM GRANULOCYTES NFR BLD AUTO: 0.2 % (ref 0–0.6)
INR PPP: 1.07 (ref 0.85–1.16)
KETONES UR QL STRIP: NEGATIVE
LEUKOCYTE ESTERASE UR QL STRIP.AUTO: ABNORMAL
LYMPHOCYTES # BLD AUTO: 1.46 10*3/MM3 (ref 0.6–4.8)
LYMPHOCYTES NFR BLD AUTO: 12.7 % (ref 24–44)
MCH RBC QN AUTO: 30.5 PG (ref 27–31)
MCHC RBC AUTO-ENTMCNC: 31.8 G/DL (ref 32–36)
MCV RBC AUTO: 95.9 FL (ref 80–99)
MONOCYTES # BLD AUTO: 0.6 10*3/MM3 (ref 0–1)
MONOCYTES NFR BLD AUTO: 5.2 % (ref 0–12)
NEUTROPHILS # BLD AUTO: 9.1 10*3/MM3 (ref 1.5–8.3)
NEUTROPHILS NFR BLD AUTO: 78.9 % (ref 41–71)
NITRITE UR QL STRIP: POSITIVE
PH UR STRIP.AUTO: 6 [PH] (ref 5–8)
PLATELET # BLD AUTO: 197 10*3/MM3 (ref 150–450)
PMV BLD AUTO: 10 FL (ref 6–12)
POTASSIUM BLD-SCNC: 3.9 MMOL/L (ref 3.5–5.5)
PROT SERPL-MCNC: 7.1 G/DL (ref 5.7–8.2)
PROT UR QL STRIP: ABNORMAL
PROTHROMBIN TIME: 13.4 SECONDS (ref 11.2–14.3)
RBC # BLD AUTO: 4.36 10*6/MM3 (ref 4.2–5.76)
RBC # UR: ABNORMAL /HPF
REF LAB TEST METHOD: ABNORMAL
RH BLD: POSITIVE
RH BLD: POSITIVE
SODIUM BLD-SCNC: 138 MMOL/L (ref 132–146)
SP GR UR STRIP: 1.02 (ref 1–1.03)
SQUAMOUS #/AREA URNS HPF: ABNORMAL /HPF
T&S EXPIRATION DATE: NORMAL
TROPONIN I SERPL-MCNC: 0.03 NG/ML (ref 0–0.07)
UROBILINOGEN UR QL STRIP: ABNORMAL
WBC NRBC COR # BLD: 11.52 10*3/MM3 (ref 3.5–10.8)
WBC UR QL AUTO: ABNORMAL /HPF
WHOLE BLOOD HOLD SPECIMEN: NORMAL
WHOLE BLOOD HOLD SPECIMEN: NORMAL

## 2019-02-20 PROCEDURE — 86900 BLOOD TYPING SEROLOGIC ABO: CPT | Performed by: EMERGENCY MEDICINE

## 2019-02-20 PROCEDURE — C1776 JOINT DEVICE (IMPLANTABLE): HCPCS | Performed by: ORTHOPAEDIC SURGERY

## 2019-02-20 PROCEDURE — 93005 ELECTROCARDIOGRAM TRACING: CPT | Performed by: EMERGENCY MEDICINE

## 2019-02-20 PROCEDURE — 25010000002 FENTANYL CITRATE (PF) 100 MCG/2ML SOLUTION: Performed by: NURSE ANESTHETIST, CERTIFIED REGISTERED

## 2019-02-20 PROCEDURE — 87077 CULTURE AEROBIC IDENTIFY: CPT | Performed by: EMERGENCY MEDICINE

## 2019-02-20 PROCEDURE — 86901 BLOOD TYPING SEROLOGIC RH(D): CPT | Performed by: EMERGENCY MEDICINE

## 2019-02-20 PROCEDURE — 87086 URINE CULTURE/COLONY COUNT: CPT | Performed by: EMERGENCY MEDICINE

## 2019-02-20 PROCEDURE — 70450 CT HEAD/BRAIN W/O DYE: CPT

## 2019-02-20 PROCEDURE — 86850 RBC ANTIBODY SCREEN: CPT | Performed by: EMERGENCY MEDICINE

## 2019-02-20 PROCEDURE — 73502 X-RAY EXAM HIP UNI 2-3 VIEWS: CPT

## 2019-02-20 PROCEDURE — 81001 URINALYSIS AUTO W/SCOPE: CPT | Performed by: EMERGENCY MEDICINE

## 2019-02-20 PROCEDURE — 25010000002 ROPIVACAINE PER 1 MG: Performed by: NURSE ANESTHETIST, CERTIFIED REGISTERED

## 2019-02-20 PROCEDURE — 86900 BLOOD TYPING SEROLOGIC ABO: CPT

## 2019-02-20 PROCEDURE — 99223 1ST HOSP IP/OBS HIGH 75: CPT | Performed by: INTERNAL MEDICINE

## 2019-02-20 PROCEDURE — 84484 ASSAY OF TROPONIN QUANT: CPT

## 2019-02-20 PROCEDURE — 99284 EMERGENCY DEPT VISIT MOD MDM: CPT

## 2019-02-20 PROCEDURE — 87147 CULTURE TYPE IMMUNOLOGIC: CPT | Performed by: EMERGENCY MEDICINE

## 2019-02-20 PROCEDURE — 0SRS01A REPLACEMENT OF LEFT HIP JOINT, FEMORAL SURFACE WITH METAL SYNTHETIC SUBSTITUTE, UNCEMENTED, OPEN APPROACH: ICD-10-PCS | Performed by: ORTHOPAEDIC SURGERY

## 2019-02-20 PROCEDURE — 73501 X-RAY EXAM HIP UNI 1 VIEW: CPT

## 2019-02-20 PROCEDURE — 25010000002 VANCOMYCIN 1 G RECONSTITUTED SOLUTION: Performed by: NURSE ANESTHETIST, CERTIFIED REGISTERED

## 2019-02-20 PROCEDURE — 72192 CT PELVIS W/O DYE: CPT

## 2019-02-20 PROCEDURE — 85025 COMPLETE CBC W/AUTO DIFF WBC: CPT | Performed by: EMERGENCY MEDICINE

## 2019-02-20 PROCEDURE — 25010000002 PROPOFOL 10 MG/ML EMULSION: Performed by: NURSE ANESTHETIST, CERTIFIED REGISTERED

## 2019-02-20 PROCEDURE — 71045 X-RAY EXAM CHEST 1 VIEW: CPT

## 2019-02-20 PROCEDURE — 80053 COMPREHEN METABOLIC PANEL: CPT | Performed by: EMERGENCY MEDICINE

## 2019-02-20 PROCEDURE — 86901 BLOOD TYPING SEROLOGIC RH(D): CPT

## 2019-02-20 PROCEDURE — 87186 SC STD MICRODIL/AGAR DIL: CPT | Performed by: EMERGENCY MEDICINE

## 2019-02-20 PROCEDURE — 85610 PROTHROMBIN TIME: CPT | Performed by: EMERGENCY MEDICINE

## 2019-02-20 DEVICE — SUMMIT FEMORAL STEM 12/14 TAPER TAPER ED W/POROCOAT SIZE 7 HI 155MM
Type: IMPLANTABLE DEVICE | Site: HIP | Status: FUNCTIONAL
Brand: SUMMIT POROCOAT

## 2019-02-20 DEVICE — PRT HIP BIPOL PRIM DEPUY 9525109/9525107: Type: IMPLANTABLE DEVICE | Site: HIP | Status: FUNCTIONAL

## 2019-02-20 DEVICE — ARTICUL/EZE FEMORAL HEAD DIAMETER 28MM +5 12/14 TAPER
Type: IMPLANTABLE DEVICE | Site: HIP | Status: FUNCTIONAL
Brand: ARTICUL/EZE

## 2019-02-20 DEVICE — SELF CENTERING BI-POLAR HEAD 28MM ID 54MM OD
Type: IMPLANTABLE DEVICE | Site: HIP | Status: FUNCTIONAL
Brand: SELF CENTERING

## 2019-02-20 RX ORDER — METOPROLOL TARTRATE 5 MG/5ML
2.5 INJECTION INTRAVENOUS ONCE
Status: COMPLETED | OUTPATIENT
Start: 2019-02-20 | End: 2019-02-20

## 2019-02-20 RX ORDER — ROPIVACAINE HYDROCHLORIDE 5 MG/ML
INJECTION, SOLUTION EPIDURAL; INFILTRATION; PERINEURAL
Status: COMPLETED | OUTPATIENT
Start: 2019-02-20 | End: 2019-02-20

## 2019-02-20 RX ORDER — MAGNESIUM HYDROXIDE 1200 MG/15ML
LIQUID ORAL AS NEEDED
Status: DISCONTINUED | OUTPATIENT
Start: 2019-02-20 | End: 2019-02-20 | Stop reason: HOSPADM

## 2019-02-20 RX ORDER — VANCOMYCIN HYDROCHLORIDE 1 G/20ML
INJECTION, POWDER, LYOPHILIZED, FOR SOLUTION INTRAVENOUS AS NEEDED
Status: DISCONTINUED | OUTPATIENT
Start: 2019-02-20 | End: 2019-02-20 | Stop reason: SURG

## 2019-02-20 RX ORDER — SODIUM CHLORIDE 0.9 % (FLUSH) 0.9 %
3 SYRINGE (ML) INJECTION EVERY 12 HOURS SCHEDULED
Status: DISCONTINUED | OUTPATIENT
Start: 2019-02-20 | End: 2019-03-01 | Stop reason: HOSPADM

## 2019-02-20 RX ORDER — NEOSTIGMINE METHYLSULFATE 5 MG/5 ML
SYRINGE (ML) INTRAVENOUS AS NEEDED
Status: DISCONTINUED | OUTPATIENT
Start: 2019-02-20 | End: 2019-02-20 | Stop reason: SURG

## 2019-02-20 RX ORDER — FAMOTIDINE 20 MG/1
20 TABLET, FILM COATED ORAL EVERY 12 HOURS SCHEDULED
Status: DISCONTINUED | OUTPATIENT
Start: 2019-02-20 | End: 2019-02-20 | Stop reason: HOSPADM

## 2019-02-20 RX ORDER — SODIUM CHLORIDE, SODIUM LACTATE, POTASSIUM CHLORIDE, CALCIUM CHLORIDE 600; 310; 30; 20 MG/100ML; MG/100ML; MG/100ML; MG/100ML
INJECTION, SOLUTION INTRAVENOUS CONTINUOUS PRN
Status: DISCONTINUED | OUTPATIENT
Start: 2019-02-20 | End: 2019-02-20 | Stop reason: SURG

## 2019-02-20 RX ORDER — LORAZEPAM 0.5 MG/1
0.5 TABLET ORAL NIGHTLY PRN
Status: DISCONTINUED | OUTPATIENT
Start: 2019-02-20 | End: 2019-03-01 | Stop reason: HOSPADM

## 2019-02-20 RX ORDER — METOPROLOL TARTRATE 50 MG/1
50 TABLET, FILM COATED ORAL DAILY
Status: DISCONTINUED | OUTPATIENT
Start: 2019-02-21 | End: 2019-03-01 | Stop reason: HOSPADM

## 2019-02-20 RX ORDER — HEPARIN SODIUM 5000 [USP'U]/ML
5000 INJECTION, SOLUTION INTRAVENOUS; SUBCUTANEOUS EVERY 12 HOURS SCHEDULED
Status: DISCONTINUED | OUTPATIENT
Start: 2019-02-21 | End: 2019-03-01 | Stop reason: HOSPADM

## 2019-02-20 RX ORDER — GLYCOPYRROLATE 0.2 MG/ML
INJECTION INTRAMUSCULAR; INTRAVENOUS AS NEEDED
Status: DISCONTINUED | OUTPATIENT
Start: 2019-02-20 | End: 2019-02-20 | Stop reason: SURG

## 2019-02-20 RX ORDER — DOCUSATE SODIUM 100 MG/1
100 CAPSULE, LIQUID FILLED ORAL DAILY PRN
Status: DISCONTINUED | OUTPATIENT
Start: 2019-02-20 | End: 2019-03-01 | Stop reason: HOSPADM

## 2019-02-20 RX ORDER — ACETAMINOPHEN 325 MG/1
650 TABLET ORAL EVERY 6 HOURS SCHEDULED
Status: DISCONTINUED | OUTPATIENT
Start: 2019-02-20 | End: 2019-03-01 | Stop reason: HOSPADM

## 2019-02-20 RX ORDER — SODIUM CHLORIDE 0.9 % (FLUSH) 0.9 %
3-10 SYRINGE (ML) INJECTION AS NEEDED
Status: DISCONTINUED | OUTPATIENT
Start: 2019-02-20 | End: 2019-02-20 | Stop reason: HOSPADM

## 2019-02-20 RX ORDER — SODIUM CHLORIDE 0.9 % (FLUSH) 0.9 %
3 SYRINGE (ML) INJECTION EVERY 12 HOURS SCHEDULED
Status: DISCONTINUED | OUTPATIENT
Start: 2019-02-20 | End: 2019-02-20 | Stop reason: HOSPADM

## 2019-02-20 RX ORDER — FENTANYL CITRATE 50 UG/ML
INJECTION, SOLUTION INTRAMUSCULAR; INTRAVENOUS AS NEEDED
Status: DISCONTINUED | OUTPATIENT
Start: 2019-02-20 | End: 2019-02-20 | Stop reason: SURG

## 2019-02-20 RX ORDER — ROPIVACAINE HYDROCHLORIDE 2 MG/ML
8 INJECTION, SOLUTION EPIDURAL; INFILTRATION CONTINUOUS
Status: DISPENSED | OUTPATIENT
Start: 2019-02-20 | End: 2019-02-25

## 2019-02-20 RX ORDER — FAMOTIDINE 20 MG/1
20 TABLET, FILM COATED ORAL
Status: DISCONTINUED | OUTPATIENT
Start: 2019-02-20 | End: 2019-02-20 | Stop reason: HOSPADM

## 2019-02-20 RX ORDER — SODIUM CHLORIDE 0.9 % (FLUSH) 0.9 %
3-10 SYRINGE (ML) INJECTION AS NEEDED
Status: DISCONTINUED | OUTPATIENT
Start: 2019-02-20 | End: 2019-03-01 | Stop reason: HOSPADM

## 2019-02-20 RX ORDER — CEFAZOLIN SODIUM 2 G/100ML
2 INJECTION, SOLUTION INTRAVENOUS EVERY 8 HOURS
Status: DISCONTINUED | OUTPATIENT
Start: 2019-02-21 | End: 2019-02-21

## 2019-02-20 RX ORDER — LOSARTAN POTASSIUM 25 MG/1
50 TABLET ORAL
Status: DISCONTINUED | OUTPATIENT
Start: 2019-02-21 | End: 2019-03-01 | Stop reason: HOSPADM

## 2019-02-20 RX ORDER — ATORVASTATIN CALCIUM 10 MG/1
10 TABLET, FILM COATED ORAL DAILY
Status: DISCONTINUED | OUTPATIENT
Start: 2019-02-21 | End: 2019-03-01 | Stop reason: HOSPADM

## 2019-02-20 RX ORDER — ATRACURIUM BESYLATE 10 MG/ML
INJECTION, SOLUTION INTRAVENOUS AS NEEDED
Status: DISCONTINUED | OUTPATIENT
Start: 2019-02-20 | End: 2019-02-20 | Stop reason: SURG

## 2019-02-20 RX ORDER — VANCOMYCIN HYDROCHLORIDE 1 G/200ML
1000 INJECTION, SOLUTION INTRAVENOUS ONCE
Status: DISCONTINUED | OUTPATIENT
Start: 2019-02-20 | End: 2019-02-21

## 2019-02-20 RX ORDER — LIDOCAINE HYDROCHLORIDE 10 MG/ML
INJECTION, SOLUTION INFILTRATION; PERINEURAL AS NEEDED
Status: DISCONTINUED | OUTPATIENT
Start: 2019-02-20 | End: 2019-02-20 | Stop reason: SURG

## 2019-02-20 RX ORDER — ACETAMINOPHEN 325 MG/1
650 TABLET ORAL EVERY 4 HOURS PRN
Status: DISCONTINUED | OUTPATIENT
Start: 2019-02-20 | End: 2019-02-21

## 2019-02-20 RX ORDER — SODIUM CHLORIDE, SODIUM LACTATE, POTASSIUM CHLORIDE, CALCIUM CHLORIDE 600; 310; 30; 20 MG/100ML; MG/100ML; MG/100ML; MG/100ML
9 INJECTION, SOLUTION INTRAVENOUS CONTINUOUS PRN
Status: DISCONTINUED | OUTPATIENT
Start: 2019-02-20 | End: 2019-02-20 | Stop reason: HOSPADM

## 2019-02-20 RX ORDER — ASPIRIN 81 MG/1
81 TABLET ORAL DAILY
Status: DISCONTINUED | OUTPATIENT
Start: 2019-02-21 | End: 2019-03-01 | Stop reason: HOSPADM

## 2019-02-20 RX ORDER — SODIUM CHLORIDE 0.9 % (FLUSH) 0.9 %
10 SYRINGE (ML) INJECTION AS NEEDED
Status: DISCONTINUED | OUTPATIENT
Start: 2019-02-20 | End: 2019-02-20

## 2019-02-20 RX ORDER — PROPOFOL 10 MG/ML
VIAL (ML) INTRAVENOUS AS NEEDED
Status: DISCONTINUED | OUTPATIENT
Start: 2019-02-20 | End: 2019-02-20 | Stop reason: SURG

## 2019-02-20 RX ORDER — DONEPEZIL HYDROCHLORIDE 10 MG/1
10 TABLET, FILM COATED ORAL NIGHTLY
Status: DISCONTINUED | OUTPATIENT
Start: 2019-02-20 | End: 2019-03-01 | Stop reason: HOSPADM

## 2019-02-20 RX ORDER — AMLODIPINE BESYLATE 5 MG/1
5 TABLET ORAL DAILY
Status: DISCONTINUED | OUTPATIENT
Start: 2019-02-21 | End: 2019-03-01 | Stop reason: HOSPADM

## 2019-02-20 RX ORDER — CEFAZOLIN SODIUM 2 G/100ML
2 INJECTION, SOLUTION INTRAVENOUS ONCE
Status: DISCONTINUED | OUTPATIENT
Start: 2019-02-20 | End: 2019-02-20 | Stop reason: HOSPADM

## 2019-02-20 RX ADMIN — DONEPEZIL HYDROCHLORIDE 10 MG: 10 TABLET, FILM COATED ORAL at 23:21

## 2019-02-20 RX ADMIN — ATRACURIUM BESYLATE 10 MG: 10 INJECTION, SOLUTION INTRAVENOUS at 17:40

## 2019-02-20 RX ADMIN — VANCOMYCIN HYDROCHLORIDE 1 G: 1 INJECTION, POWDER, LYOPHILIZED, FOR SOLUTION INTRAVENOUS at 17:31

## 2019-02-20 RX ADMIN — ACETAMINOPHEN 650 MG: 325 TABLET ORAL at 23:21

## 2019-02-20 RX ADMIN — FENTANYL CITRATE 100 MCG: 50 INJECTION, SOLUTION INTRAMUSCULAR; INTRAVENOUS at 17:17

## 2019-02-20 RX ADMIN — PROPOFOL 20 MG: 10 INJECTION, EMULSION INTRAVENOUS at 17:40

## 2019-02-20 RX ADMIN — METOPROLOL TARTRATE 2.5 MG: 5 INJECTION INTRAVENOUS at 19:24

## 2019-02-20 RX ADMIN — SODIUM CHLORIDE, POTASSIUM CHLORIDE, SODIUM LACTATE AND CALCIUM CHLORIDE 9 ML/HR: 600; 310; 30; 20 INJECTION, SOLUTION INTRAVENOUS at 16:28

## 2019-02-20 RX ADMIN — SODIUM CHLORIDE, POTASSIUM CHLORIDE, SODIUM LACTATE AND CALCIUM CHLORIDE: 600; 310; 30; 20 INJECTION, SOLUTION INTRAVENOUS at 17:07

## 2019-02-20 RX ADMIN — ATRACURIUM BESYLATE 20 MG: 10 INJECTION, SOLUTION INTRAVENOUS at 17:07

## 2019-02-20 RX ADMIN — Medication 3 MG: at 18:35

## 2019-02-20 RX ADMIN — FAMOTIDINE 20 MG: 10 INJECTION, SOLUTION INTRAVENOUS at 16:25

## 2019-02-20 RX ADMIN — ROPIVACAINE HYDROCHLORIDE 8 ML/HR: 2 INJECTION, SOLUTION EPIDURAL; INFILTRATION at 14:00

## 2019-02-20 RX ADMIN — GLYCOPYRROLATE 0.4 MG: 0.2 INJECTION, SOLUTION INTRAMUSCULAR; INTRAVENOUS at 18:35

## 2019-02-20 RX ADMIN — ROPIVACAINE HYDROCHLORIDE 30 ML: 5 INJECTION, SOLUTION EPIDURAL; INFILTRATION; PERINEURAL at 13:20

## 2019-02-20 RX ADMIN — PROPOFOL 40 MG: 10 INJECTION, EMULSION INTRAVENOUS at 17:07

## 2019-02-20 RX ADMIN — LIDOCAINE HYDROCHLORIDE 50 MG: 10 INJECTION, SOLUTION INFILTRATION; PERINEURAL at 17:07

## 2019-02-20 NOTE — ANESTHESIA PROCEDURE NOTES
Peripheral Block      Patient location during procedure: pre-op  Reason for block: procedure for pain and at surgeon's request  Performed by  CRNA: Sam Iglesias, CRNA  Assisted by: Jane Oliveira RN  Preanesthetic Checklist  Completed: patient identified, site marked, surgical consent, pre-op evaluation, timeout performed, IV checked, risks and benefits discussed and monitors and equipment checked  Prep:  Sterile barriers:cap, gloves and mask  Prep: ChloraPrep  Patient monitoring: blood pressure monitoring, continuous pulse oximetry and EKG  Procedure    Guidance:ultrasound guided  Images:still images obtained    Laterality:left  Block Type:fascia iliaca catheter  Injection Technique:catheter  Needle Type:echogenic  Needle Gauge:18 G    Catheter Size:20 G (20g)  Cath Depth at skin: 20 cm  Medications Used: ropivacaine (NAROPIN) 0.5 % injection, 30 mL  Med admintered at 2/20/2019 1:20 PM  Medications  Preservative Free Saline:10ml  Comment:30ml NS added.    Post Assessment  Injection Assessment: negative aspiration for heme, no paresthesia on injection and incremental injection  Patient Tolerance:comfortable throughout block  Complications:no  Additional Notes  Procedure:                 Pt placed in supine position.   The insertion site was prepped in sterile fashion with Chlorapreop and clear plastic drapes.  Analgesia was provided by skin infiltration at insertion site with Lidocaine 1% 3mls.  A B-Mclaughlin 18 g , 4 inch echogenic Touhy needle was advance In-plane under ultrasound guidance. The   Anterior superior Iliac crest was initially visualized and the probe was directed slightly medially and slightly towards the umbilicus.  The course of the needle was tracked over the sartorius muscle through the fascia Iliacus and into the anterior portion of the Iliacus muscle.  Major vessels where identified and avoided as where structures of the peritoneal cavity.  LA injection was made incrementally in 1-5ml amounts  spread was visualized superiorly below fascia iliacus.  Injection was completed with negative aspiration of blood and negative intravascular injection.  Injection pressures where normal or minimal resistance.  A 20 g B-Mclaughlin wire styleted catheter was then advance thru the needle and very easily placed in a superior or cephalad direction.  The catheter was secured at insertion site with skin afix , mastisol, steristreps.  A CHG tegaderm dressing was placed over the insertion site and the nerve catheter labeled and capped.  Thank You.

## 2019-02-20 NOTE — ANESTHESIA PROCEDURE NOTES
Airway  Urgency: elective    Airway not difficult    General Information and Staff    Patient location during procedure: OR    Indications and Patient Condition  Indications for airway management: airway protection    Preoxygenated: yes  MILS not maintained throughout  Mask difficulty assessment: 1 - vent by mask    Final Airway Details  Final airway type: endotracheal airway      Successful airway: ETT  Cuffed: yes   Successful intubation technique: direct laryngoscopy  Endotracheal tube insertion site: oral  Blade: Monica  Blade size: 3  ETT size (mm): 7.0  Cormack-Lehane Classification: grade I - full view of glottis  Placement verified by: chest auscultation and capnometry   Measured from: lips  ETT to lips (cm): 20  Number of attempts at approach: 1    Additional Comments  Negative epigastric sounds, Breath sound equal bilaterally with symmetric chest rise and fall

## 2019-02-20 NOTE — ANESTHESIA PREPROCEDURE EVALUATION
Anesthesia Evaluation     Patient summary reviewed and Nursing notes reviewed   no history of anesthetic complications:  NPO Solid Status: > 8 hours  NPO Liquid Status: > 8 hours           Airway   Mallampati: II  TM distance: >3 FB  Neck ROM: full  No difficulty expected  Dental      Pulmonary - negative pulmonary ROS and normal exam   Cardiovascular - normal exam    (+) hypertension, CAD, hyperlipidemia,       Neuro/Psych  (+) psychiatric history, dementia,     GI/Hepatic/Renal/Endo      Musculoskeletal     Abdominal    Substance History      OB/GYN          Other                        Anesthesia Plan    ASA 3     general with block     intravenous induction   Anesthetic plan, all risks, benefits, and alternatives have been provided, discussed and informed consent has been obtained with: patient.    Plan discussed with CRNA.

## 2019-02-21 PROBLEM — N39.0 ACUTE UTI (URINARY TRACT INFECTION): Status: ACTIVE | Noted: 2019-02-21

## 2019-02-21 PROCEDURE — 25010000003 CEFAZOLIN IN DEXTROSE 2-4 GM/100ML-% SOLUTION: Performed by: ORTHOPAEDIC SURGERY

## 2019-02-21 PROCEDURE — 25010000002 ROPIVACAINE PER 1 MG: Performed by: NURSE ANESTHETIST, CERTIFIED REGISTERED

## 2019-02-21 PROCEDURE — 87040 BLOOD CULTURE FOR BACTERIA: CPT | Performed by: INTERNAL MEDICINE

## 2019-02-21 PROCEDURE — 97167 OT EVAL HIGH COMPLEX 60 MIN: CPT | Performed by: OCCUPATIONAL THERAPIST

## 2019-02-21 PROCEDURE — 25010000002 VANCOMYCIN: Performed by: INTERNAL MEDICINE

## 2019-02-21 PROCEDURE — 25010000002 HEPARIN (PORCINE) PER 1000 UNITS: Performed by: ORTHOPAEDIC SURGERY

## 2019-02-21 PROCEDURE — 99232 SBSQ HOSP IP/OBS MODERATE 35: CPT | Performed by: INTERNAL MEDICINE

## 2019-02-21 PROCEDURE — 97163 PT EVAL HIGH COMPLEX 45 MIN: CPT

## 2019-02-21 PROCEDURE — 25010000002 CEFTRIAXONE PER 250 MG: Performed by: INTERNAL MEDICINE

## 2019-02-21 RX ORDER — CEFTRIAXONE SODIUM 1 G/50ML
1 INJECTION, SOLUTION INTRAVENOUS EVERY 24 HOURS
Status: DISCONTINUED | OUTPATIENT
Start: 2019-02-21 | End: 2019-02-21

## 2019-02-21 RX ORDER — CEFTRIAXONE SODIUM 1 G/50ML
1 INJECTION, SOLUTION INTRAVENOUS EVERY 24 HOURS
Status: DISCONTINUED | OUTPATIENT
Start: 2019-02-21 | End: 2019-02-22

## 2019-02-21 RX ADMIN — SODIUM CHLORIDE, PRESERVATIVE FREE 3 ML: 5 INJECTION INTRAVENOUS at 10:29

## 2019-02-21 RX ADMIN — CEFAZOLIN SODIUM 2 G: 2 INJECTION, SOLUTION INTRAVENOUS at 02:13

## 2019-02-21 RX ADMIN — VANCOMYCIN HYDROCHLORIDE 1250 MG: 10 INJECTION, POWDER, LYOPHILIZED, FOR SOLUTION INTRAVENOUS at 17:16

## 2019-02-21 RX ADMIN — ROPIVACAINE HYDROCHLORIDE 8 ML/HR: 2 INJECTION, SOLUTION EPIDURAL; INFILTRATION at 13:13

## 2019-02-21 RX ADMIN — ATORVASTATIN CALCIUM 10 MG: 10 TABLET, FILM COATED ORAL at 10:29

## 2019-02-21 RX ADMIN — LOSARTAN POTASSIUM 50 MG: 25 TABLET, FILM COATED ORAL at 09:55

## 2019-02-21 RX ADMIN — METOPROLOL TARTRATE 50 MG: 50 TABLET ORAL at 09:55

## 2019-02-21 RX ADMIN — HEPARIN SODIUM 5000 UNITS: 5000 INJECTION INTRAVENOUS; SUBCUTANEOUS at 09:55

## 2019-02-21 RX ADMIN — SERTRALINE HYDROCHLORIDE 25 MG: 50 TABLET ORAL at 09:55

## 2019-02-21 RX ADMIN — DONEPEZIL HYDROCHLORIDE 10 MG: 10 TABLET, FILM COATED ORAL at 21:29

## 2019-02-21 RX ADMIN — ACETAMINOPHEN 650 MG: 325 TABLET ORAL at 13:03

## 2019-02-21 RX ADMIN — HEPARIN SODIUM 5000 UNITS: 5000 INJECTION INTRAVENOUS; SUBCUTANEOUS at 21:30

## 2019-02-21 RX ADMIN — ACETAMINOPHEN 650 MG: 325 TABLET ORAL at 05:52

## 2019-02-21 RX ADMIN — ACETAMINOPHEN 650 MG: 325 TABLET ORAL at 17:14

## 2019-02-21 RX ADMIN — ACETAMINOPHEN 650 MG: 325 TABLET ORAL at 23:53

## 2019-02-21 RX ADMIN — ASPIRIN 81 MG: 81 TABLET, COATED ORAL at 09:55

## 2019-02-21 RX ADMIN — CEFTRIAXONE SODIUM 1 G: 1 INJECTION, SOLUTION INTRAVENOUS at 13:04

## 2019-02-21 NOTE — ANESTHESIA POSTPROCEDURE EVALUATION
Patient: Tee Flores    Procedure Summary     Date:  02/20/19 [facility]    Anesthesia Start:  1707 Anesthesia Stop:  1903    Procedure:  HIP HEMIARTHROPLASTY (Left Hip) Diagnosis:  Closed displaced fracture of left femoral neck (CMS/HCC)    Surgeon:  Mina Baez MD Provider:  Jhonny Patton MD    Anesthesia Type:  general with block ASA Status:  3          Anesthesia Type: general with block  Last vitals  BP   140/52 (02/20/19 1621)   Temp   100.1 °F (37.8 °C) (02/20/19 1621)   Pulse   90 (02/20/19 1621)   Resp   16 (02/20/19 1621)     SpO2   94 % (02/20/19 1621)     Post Anesthesia Care and Evaluation    Patient location during evaluation: PACU  Patient participation: complete - patient participated  Level of consciousness: awake and alert  Pain score: 0  Pain management: adequate  Airway patency: patent  Anesthetic complications: No anesthetic complications  PONV Status: none  Cardiovascular status: hemodynamically stable and acceptable  Respiratory status: nonlabored ventilation, acceptable and nasal cannula  Hydration status: acceptable

## 2019-02-22 LAB
ANION GAP SERPL CALCULATED.3IONS-SCNC: 7 MMOL/L (ref 3–11)
BACTERIA SPEC AEROBE CULT: ABNORMAL
BASOPHILS # BLD AUTO: 0.01 10*3/MM3 (ref 0–0.2)
BASOPHILS NFR BLD AUTO: 0.1 % (ref 0–1)
BUN BLD-MCNC: 17 MG/DL (ref 9–23)
BUN/CREAT SERPL: 21.3 (ref 7–25)
CALCIUM SPEC-SCNC: 7.9 MG/DL (ref 8.7–10.4)
CHLORIDE SERPL-SCNC: 103 MMOL/L (ref 99–109)
CO2 SERPL-SCNC: 25 MMOL/L (ref 20–31)
CREAT BLD-MCNC: 0.8 MG/DL (ref 0.6–1.3)
DEPRECATED RDW RBC AUTO: 50.2 FL (ref 37–54)
EOSINOPHIL # BLD AUTO: 0.38 10*3/MM3 (ref 0–0.3)
EOSINOPHIL NFR BLD AUTO: 5.2 % (ref 0–3)
ERYTHROCYTE [DISTWIDTH] IN BLOOD BY AUTOMATED COUNT: 14.4 % (ref 11.3–14.5)
GFR SERPL CREATININE-BSD FRML MDRD: 91 ML/MIN/1.73
GLUCOSE BLD-MCNC: 102 MG/DL (ref 70–100)
HCT VFR BLD AUTO: 25.3 % (ref 38.9–50.9)
HGB BLD-MCNC: 8 G/DL (ref 13.1–17.5)
IMM GRANULOCYTES # BLD AUTO: 0.02 10*3/MM3 (ref 0–0.05)
IMM GRANULOCYTES NFR BLD AUTO: 0.3 % (ref 0–0.6)
LYMPHOCYTES # BLD AUTO: 1.21 10*3/MM3 (ref 0.6–4.8)
LYMPHOCYTES NFR BLD AUTO: 16.6 % (ref 24–44)
MCH RBC QN AUTO: 30.4 PG (ref 27–31)
MCHC RBC AUTO-ENTMCNC: 31.6 G/DL (ref 32–36)
MCV RBC AUTO: 96.2 FL (ref 80–99)
MONOCYTES # BLD AUTO: 0.68 10*3/MM3 (ref 0–1)
MONOCYTES NFR BLD AUTO: 9.4 % (ref 0–12)
NEUTROPHILS # BLD AUTO: 4.99 10*3/MM3 (ref 1.5–8.3)
NEUTROPHILS NFR BLD AUTO: 68.7 % (ref 41–71)
PLATELET # BLD AUTO: 150 10*3/MM3 (ref 150–450)
PMV BLD AUTO: 10 FL (ref 6–12)
POTASSIUM BLD-SCNC: 3.9 MMOL/L (ref 3.5–5.5)
RBC # BLD AUTO: 2.63 10*6/MM3 (ref 4.2–5.76)
SODIUM BLD-SCNC: 135 MMOL/L (ref 132–146)
VANCOMYCIN SERPL-MCNC: 8 MCG/ML (ref 5–40)
WBC NRBC COR # BLD: 7.27 10*3/MM3 (ref 3.5–10.8)

## 2019-02-22 PROCEDURE — 25010000002 VANCOMYCIN 10 G RECONSTITUTED SOLUTION

## 2019-02-22 PROCEDURE — 85025 COMPLETE CBC W/AUTO DIFF WBC: CPT | Performed by: INTERNAL MEDICINE

## 2019-02-22 PROCEDURE — 80202 ASSAY OF VANCOMYCIN: CPT

## 2019-02-22 PROCEDURE — 25010000002 CEFTRIAXONE PER 250 MG: Performed by: INTERNAL MEDICINE

## 2019-02-22 PROCEDURE — 80048 BASIC METABOLIC PNL TOTAL CA: CPT

## 2019-02-22 PROCEDURE — 97530 THERAPEUTIC ACTIVITIES: CPT

## 2019-02-22 PROCEDURE — 25010000002 ROPIVACAINE PER 1 MG: Performed by: NURSE ANESTHETIST, CERTIFIED REGISTERED

## 2019-02-22 PROCEDURE — 25010000002 HEPARIN (PORCINE) PER 1000 UNITS: Performed by: ORTHOPAEDIC SURGERY

## 2019-02-22 PROCEDURE — 99232 SBSQ HOSP IP/OBS MODERATE 35: CPT | Performed by: INTERNAL MEDICINE

## 2019-02-22 RX ADMIN — CEFTRIAXONE SODIUM 1 G: 1 INJECTION, SOLUTION INTRAVENOUS at 10:16

## 2019-02-22 RX ADMIN — SODIUM CHLORIDE, PRESERVATIVE FREE 3 ML: 5 INJECTION INTRAVENOUS at 20:15

## 2019-02-22 RX ADMIN — ACETAMINOPHEN 650 MG: 325 TABLET ORAL at 05:37

## 2019-02-22 RX ADMIN — ACETAMINOPHEN 650 MG: 325 TABLET ORAL at 11:57

## 2019-02-22 RX ADMIN — VANCOMYCIN HYDROCHLORIDE 1250 MG: 10 INJECTION, POWDER, LYOPHILIZED, FOR SOLUTION INTRAVENOUS at 14:29

## 2019-02-22 RX ADMIN — ROPIVACAINE HYDROCHLORIDE 8 ML/HR: 2 INJECTION, SOLUTION EPIDURAL; INFILTRATION at 11:57

## 2019-02-22 RX ADMIN — AMLODIPINE BESYLATE 5 MG: 5 TABLET ORAL at 08:57

## 2019-02-22 RX ADMIN — LOSARTAN POTASSIUM 50 MG: 25 TABLET, FILM COATED ORAL at 08:57

## 2019-02-22 RX ADMIN — SODIUM CHLORIDE, PRESERVATIVE FREE 3 ML: 5 INJECTION INTRAVENOUS at 08:56

## 2019-02-22 RX ADMIN — METOPROLOL TARTRATE 50 MG: 50 TABLET ORAL at 08:57

## 2019-02-22 RX ADMIN — HEPARIN SODIUM 5000 UNITS: 5000 INJECTION INTRAVENOUS; SUBCUTANEOUS at 08:56

## 2019-02-22 RX ADMIN — ACETAMINOPHEN 650 MG: 325 TABLET ORAL at 17:53

## 2019-02-22 RX ADMIN — POLYETHYLENE GLYCOL 3350 17 G: 17 POWDER, FOR SOLUTION ORAL at 10:24

## 2019-02-22 RX ADMIN — ATORVASTATIN CALCIUM 10 MG: 10 TABLET, FILM COATED ORAL at 08:57

## 2019-02-22 RX ADMIN — DONEPEZIL HYDROCHLORIDE 10 MG: 10 TABLET, FILM COATED ORAL at 20:15

## 2019-02-22 RX ADMIN — SERTRALINE HYDROCHLORIDE 25 MG: 50 TABLET ORAL at 08:56

## 2019-02-22 RX ADMIN — HEPARIN SODIUM 5000 UNITS: 5000 INJECTION INTRAVENOUS; SUBCUTANEOUS at 20:15

## 2019-02-22 RX ADMIN — ASPIRIN 81 MG: 81 TABLET, COATED ORAL at 08:57

## 2019-02-23 LAB
HCT VFR BLD AUTO: 26.4 % (ref 38.9–50.9)
HGB BLD-MCNC: 8.4 G/DL (ref 13.1–17.5)

## 2019-02-23 PROCEDURE — 85014 HEMATOCRIT: CPT | Performed by: INTERNAL MEDICINE

## 2019-02-23 PROCEDURE — 99232 SBSQ HOSP IP/OBS MODERATE 35: CPT | Performed by: INTERNAL MEDICINE

## 2019-02-23 PROCEDURE — 85018 HEMOGLOBIN: CPT | Performed by: INTERNAL MEDICINE

## 2019-02-23 PROCEDURE — 25010000002 HEPARIN (PORCINE) PER 1000 UNITS: Performed by: ORTHOPAEDIC SURGERY

## 2019-02-23 PROCEDURE — 25010000002 VANCOMYCIN 10 G RECONSTITUTED SOLUTION

## 2019-02-23 PROCEDURE — 25010000002 ONDANSETRON PER 1 MG: Performed by: INTERNAL MEDICINE

## 2019-02-23 RX ORDER — ONDANSETRON 2 MG/ML
4 INJECTION INTRAMUSCULAR; INTRAVENOUS EVERY 6 HOURS PRN
Status: DISCONTINUED | OUTPATIENT
Start: 2019-02-23 | End: 2019-03-01 | Stop reason: HOSPADM

## 2019-02-23 RX ADMIN — ACETAMINOPHEN 650 MG: 325 TABLET ORAL at 00:27

## 2019-02-23 RX ADMIN — HEPARIN SODIUM 5000 UNITS: 5000 INJECTION INTRAVENOUS; SUBCUTANEOUS at 08:55

## 2019-02-23 RX ADMIN — SODIUM CHLORIDE, PRESERVATIVE FREE 3 ML: 5 INJECTION INTRAVENOUS at 08:55

## 2019-02-23 RX ADMIN — ACETAMINOPHEN 650 MG: 325 TABLET ORAL at 17:40

## 2019-02-23 RX ADMIN — ATORVASTATIN CALCIUM 10 MG: 10 TABLET, FILM COATED ORAL at 08:55

## 2019-02-23 RX ADMIN — METOPROLOL TARTRATE 50 MG: 50 TABLET ORAL at 08:57

## 2019-02-23 RX ADMIN — ONDANSETRON 4 MG: 2 INJECTION INTRAMUSCULAR; INTRAVENOUS at 09:57

## 2019-02-23 RX ADMIN — ACETAMINOPHEN 650 MG: 325 TABLET ORAL at 06:14

## 2019-02-23 RX ADMIN — DONEPEZIL HYDROCHLORIDE 10 MG: 10 TABLET, FILM COATED ORAL at 20:11

## 2019-02-23 RX ADMIN — HEPARIN SODIUM 5000 UNITS: 5000 INJECTION INTRAVENOUS; SUBCUTANEOUS at 20:10

## 2019-02-23 RX ADMIN — ONDANSETRON 4 MG: 2 INJECTION INTRAMUSCULAR; INTRAVENOUS at 16:17

## 2019-02-23 RX ADMIN — ASPIRIN 81 MG: 81 TABLET, COATED ORAL at 08:55

## 2019-02-23 RX ADMIN — VANCOMYCIN HYDROCHLORIDE 1250 MG: 10 INJECTION, POWDER, LYOPHILIZED, FOR SOLUTION INTRAVENOUS at 14:12

## 2019-02-23 RX ADMIN — SERTRALINE HYDROCHLORIDE 25 MG: 50 TABLET ORAL at 08:58

## 2019-02-23 RX ADMIN — LOSARTAN POTASSIUM 50 MG: 25 TABLET, FILM COATED ORAL at 08:57

## 2019-02-23 RX ADMIN — AMLODIPINE BESYLATE 5 MG: 5 TABLET ORAL at 06:14

## 2019-02-24 LAB
ANION GAP SERPL CALCULATED.3IONS-SCNC: 9 MMOL/L (ref 3–11)
BUN BLD-MCNC: 19 MG/DL (ref 9–23)
BUN/CREAT SERPL: 23.8 (ref 7–25)
CALCIUM SPEC-SCNC: 7.7 MG/DL (ref 8.7–10.4)
CHLORIDE SERPL-SCNC: 105 MMOL/L (ref 99–109)
CO2 SERPL-SCNC: 25 MMOL/L (ref 20–31)
CREAT BLD-MCNC: 0.8 MG/DL (ref 0.6–1.3)
GFR SERPL CREATININE-BSD FRML MDRD: 91 ML/MIN/1.73
GLUCOSE BLD-MCNC: 104 MG/DL (ref 70–100)
POTASSIUM BLD-SCNC: 4.2 MMOL/L (ref 3.5–5.5)
SODIUM BLD-SCNC: 139 MMOL/L (ref 132–146)
VANCOMYCIN TROUGH SERPL-MCNC: 10.5 MCG/ML (ref 10–20)

## 2019-02-24 PROCEDURE — 80202 ASSAY OF VANCOMYCIN: CPT

## 2019-02-24 PROCEDURE — 25010000002 HEPARIN (PORCINE) PER 1000 UNITS: Performed by: ORTHOPAEDIC SURGERY

## 2019-02-24 PROCEDURE — 25010000002 VANCOMYCIN 10 G RECONSTITUTED SOLUTION

## 2019-02-24 PROCEDURE — 80048 BASIC METABOLIC PNL TOTAL CA: CPT | Performed by: INTERNAL MEDICINE

## 2019-02-24 PROCEDURE — 99232 SBSQ HOSP IP/OBS MODERATE 35: CPT | Performed by: INTERNAL MEDICINE

## 2019-02-24 RX ADMIN — ACETAMINOPHEN 650 MG: 325 TABLET ORAL at 00:34

## 2019-02-24 RX ADMIN — HEPARIN SODIUM 5000 UNITS: 5000 INJECTION INTRAVENOUS; SUBCUTANEOUS at 20:23

## 2019-02-24 RX ADMIN — SODIUM CHLORIDE, PRESERVATIVE FREE 3 ML: 5 INJECTION INTRAVENOUS at 09:17

## 2019-02-24 RX ADMIN — HEPARIN SODIUM 5000 UNITS: 5000 INJECTION INTRAVENOUS; SUBCUTANEOUS at 09:17

## 2019-02-24 RX ADMIN — DOCUSATE SODIUM 100 MG: 100 CAPSULE, LIQUID FILLED ORAL at 09:16

## 2019-02-24 RX ADMIN — ACETAMINOPHEN 650 MG: 325 TABLET ORAL at 11:58

## 2019-02-24 RX ADMIN — AMLODIPINE BESYLATE 5 MG: 5 TABLET ORAL at 09:16

## 2019-02-24 RX ADMIN — ASPIRIN 81 MG: 81 TABLET, COATED ORAL at 09:16

## 2019-02-24 RX ADMIN — SODIUM CHLORIDE, PRESERVATIVE FREE 3 ML: 5 INJECTION INTRAVENOUS at 20:23

## 2019-02-24 RX ADMIN — ACETAMINOPHEN 650 MG: 325 TABLET ORAL at 05:40

## 2019-02-24 RX ADMIN — METOPROLOL TARTRATE 50 MG: 50 TABLET ORAL at 09:16

## 2019-02-24 RX ADMIN — ACETAMINOPHEN 650 MG: 325 TABLET ORAL at 17:38

## 2019-02-24 RX ADMIN — LOSARTAN POTASSIUM 50 MG: 25 TABLET, FILM COATED ORAL at 09:16

## 2019-02-24 RX ADMIN — DONEPEZIL HYDROCHLORIDE 10 MG: 10 TABLET, FILM COATED ORAL at 20:23

## 2019-02-24 RX ADMIN — SERTRALINE HYDROCHLORIDE 25 MG: 50 TABLET ORAL at 09:16

## 2019-02-24 RX ADMIN — VANCOMYCIN HYDROCHLORIDE 1250 MG: 10 INJECTION, POWDER, LYOPHILIZED, FOR SOLUTION INTRAVENOUS at 14:36

## 2019-02-24 RX ADMIN — ATORVASTATIN CALCIUM 10 MG: 10 TABLET, FILM COATED ORAL at 09:16

## 2019-02-24 RX ADMIN — POLYETHYLENE GLYCOL 3350 17 G: 17 POWDER, FOR SOLUTION ORAL at 09:16

## 2019-02-25 PROCEDURE — 99232 SBSQ HOSP IP/OBS MODERATE 35: CPT | Performed by: INTERNAL MEDICINE

## 2019-02-25 PROCEDURE — 97530 THERAPEUTIC ACTIVITIES: CPT | Performed by: OCCUPATIONAL THERAPIST

## 2019-02-25 PROCEDURE — 92610 EVALUATE SWALLOWING FUNCTION: CPT

## 2019-02-25 PROCEDURE — 97530 THERAPEUTIC ACTIVITIES: CPT

## 2019-02-25 PROCEDURE — 25010000002 VANCOMYCIN 10 G RECONSTITUTED SOLUTION

## 2019-02-25 PROCEDURE — 25010000002 HEPARIN (PORCINE) PER 1000 UNITS: Performed by: ORTHOPAEDIC SURGERY

## 2019-02-25 RX ADMIN — ACETAMINOPHEN 650 MG: 325 TABLET ORAL at 05:15

## 2019-02-25 RX ADMIN — SODIUM CHLORIDE, PRESERVATIVE FREE 3 ML: 5 INJECTION INTRAVENOUS at 19:58

## 2019-02-25 RX ADMIN — ACETAMINOPHEN 650 MG: 325 TABLET ORAL at 00:04

## 2019-02-25 RX ADMIN — ASPIRIN 81 MG: 81 TABLET, COATED ORAL at 09:40

## 2019-02-25 RX ADMIN — SERTRALINE HYDROCHLORIDE 25 MG: 50 TABLET ORAL at 09:40

## 2019-02-25 RX ADMIN — POLYETHYLENE GLYCOL 3350 17 G: 17 POWDER, FOR SOLUTION ORAL at 09:40

## 2019-02-25 RX ADMIN — ATORVASTATIN CALCIUM 10 MG: 10 TABLET, FILM COATED ORAL at 09:40

## 2019-02-25 RX ADMIN — ACETAMINOPHEN 650 MG: 325 TABLET ORAL at 18:25

## 2019-02-25 RX ADMIN — ACETAMINOPHEN 650 MG: 325 TABLET ORAL at 13:05

## 2019-02-25 RX ADMIN — METOPROLOL TARTRATE 50 MG: 50 TABLET ORAL at 09:40

## 2019-02-25 RX ADMIN — SODIUM CHLORIDE, PRESERVATIVE FREE 3 ML: 5 INJECTION INTRAVENOUS at 09:41

## 2019-02-25 RX ADMIN — HEPARIN SODIUM 5000 UNITS: 5000 INJECTION INTRAVENOUS; SUBCUTANEOUS at 09:40

## 2019-02-25 RX ADMIN — VANCOMYCIN HYDROCHLORIDE 1250 MG: 10 INJECTION, POWDER, LYOPHILIZED, FOR SOLUTION INTRAVENOUS at 14:02

## 2019-02-25 RX ADMIN — HEPARIN SODIUM 5000 UNITS: 5000 INJECTION INTRAVENOUS; SUBCUTANEOUS at 19:58

## 2019-02-25 RX ADMIN — DONEPEZIL HYDROCHLORIDE 10 MG: 10 TABLET, FILM COATED ORAL at 19:58

## 2019-02-25 RX ADMIN — LOSARTAN POTASSIUM 50 MG: 25 TABLET, FILM COATED ORAL at 09:40

## 2019-02-25 RX ADMIN — AMLODIPINE BESYLATE 5 MG: 5 TABLET ORAL at 09:40

## 2019-02-26 LAB
BACTERIA SPEC AEROBE CULT: NORMAL
BACTERIA SPEC AEROBE CULT: NORMAL

## 2019-02-26 PROCEDURE — 25010000002 HEPARIN (PORCINE) PER 1000 UNITS: Performed by: ORTHOPAEDIC SURGERY

## 2019-02-26 PROCEDURE — 99232 SBSQ HOSP IP/OBS MODERATE 35: CPT | Performed by: INTERNAL MEDICINE

## 2019-02-26 PROCEDURE — 25010000002 VANCOMYCIN 10 G RECONSTITUTED SOLUTION

## 2019-02-26 RX ADMIN — ACETAMINOPHEN 650 MG: 325 TABLET ORAL at 05:10

## 2019-02-26 RX ADMIN — DONEPEZIL HYDROCHLORIDE 10 MG: 10 TABLET, FILM COATED ORAL at 21:22

## 2019-02-26 RX ADMIN — ACETAMINOPHEN 650 MG: 325 TABLET ORAL at 00:10

## 2019-02-26 RX ADMIN — SERTRALINE HYDROCHLORIDE 25 MG: 50 TABLET ORAL at 08:26

## 2019-02-26 RX ADMIN — AMLODIPINE BESYLATE 5 MG: 5 TABLET ORAL at 08:26

## 2019-02-26 RX ADMIN — HEPARIN SODIUM 5000 UNITS: 5000 INJECTION INTRAVENOUS; SUBCUTANEOUS at 21:22

## 2019-02-26 RX ADMIN — ACETAMINOPHEN 650 MG: 325 TABLET ORAL at 18:24

## 2019-02-26 RX ADMIN — DOCUSATE SODIUM 100 MG: 100 CAPSULE, LIQUID FILLED ORAL at 08:26

## 2019-02-26 RX ADMIN — SODIUM CHLORIDE, PRESERVATIVE FREE 3 ML: 5 INJECTION INTRAVENOUS at 08:32

## 2019-02-26 RX ADMIN — ATORVASTATIN CALCIUM 10 MG: 10 TABLET, FILM COATED ORAL at 08:26

## 2019-02-26 RX ADMIN — VANCOMYCIN HYDROCHLORIDE 1250 MG: 10 INJECTION, POWDER, LYOPHILIZED, FOR SOLUTION INTRAVENOUS at 14:31

## 2019-02-26 RX ADMIN — POLYETHYLENE GLYCOL 3350 17 G: 17 POWDER, FOR SOLUTION ORAL at 08:26

## 2019-02-26 RX ADMIN — LOSARTAN POTASSIUM 50 MG: 25 TABLET, FILM COATED ORAL at 08:26

## 2019-02-26 RX ADMIN — ASPIRIN 81 MG: 81 TABLET, COATED ORAL at 08:26

## 2019-02-26 RX ADMIN — METOPROLOL TARTRATE 50 MG: 50 TABLET ORAL at 08:26

## 2019-02-26 RX ADMIN — HEPARIN SODIUM 5000 UNITS: 5000 INJECTION INTRAVENOUS; SUBCUTANEOUS at 08:26

## 2019-02-27 LAB
DEPRECATED RDW RBC AUTO: 52.2 FL (ref 37–54)
ERYTHROCYTE [DISTWIDTH] IN BLOOD BY AUTOMATED COUNT: 14.7 % (ref 11.3–14.5)
HCT VFR BLD AUTO: 25 % (ref 38.9–50.9)
HGB BLD-MCNC: 7.7 G/DL (ref 13.1–17.5)
MCH RBC QN AUTO: 30.2 PG (ref 27–31)
MCHC RBC AUTO-ENTMCNC: 30.8 G/DL (ref 32–36)
MCV RBC AUTO: 98 FL (ref 80–99)
PLATELET # BLD AUTO: 279 10*3/MM3 (ref 150–450)
PMV BLD AUTO: 9.9 FL (ref 6–12)
RBC # BLD AUTO: 2.55 10*6/MM3 (ref 4.2–5.76)
WBC NRBC COR # BLD: 11.8 10*3/MM3 (ref 3.5–10.8)

## 2019-02-27 PROCEDURE — 99232 SBSQ HOSP IP/OBS MODERATE 35: CPT | Performed by: INTERNAL MEDICINE

## 2019-02-27 PROCEDURE — 25010000002 HEPARIN (PORCINE) PER 1000 UNITS: Performed by: ORTHOPAEDIC SURGERY

## 2019-02-27 PROCEDURE — 85027 COMPLETE CBC AUTOMATED: CPT | Performed by: INTERNAL MEDICINE

## 2019-02-27 PROCEDURE — 25010000002 VANCOMYCIN 10 G RECONSTITUTED SOLUTION

## 2019-02-27 PROCEDURE — 97110 THERAPEUTIC EXERCISES: CPT

## 2019-02-27 RX ADMIN — ACETAMINOPHEN 650 MG: 325 TABLET ORAL at 17:44

## 2019-02-27 RX ADMIN — AMLODIPINE BESYLATE 5 MG: 5 TABLET ORAL at 10:33

## 2019-02-27 RX ADMIN — METOPROLOL TARTRATE 50 MG: 50 TABLET ORAL at 10:32

## 2019-02-27 RX ADMIN — VANCOMYCIN HYDROCHLORIDE 1250 MG: 10 INJECTION, POWDER, LYOPHILIZED, FOR SOLUTION INTRAVENOUS at 13:17

## 2019-02-27 RX ADMIN — SODIUM CHLORIDE, PRESERVATIVE FREE 3 ML: 5 INJECTION INTRAVENOUS at 10:34

## 2019-02-27 RX ADMIN — DONEPEZIL HYDROCHLORIDE 10 MG: 10 TABLET, FILM COATED ORAL at 21:06

## 2019-02-27 RX ADMIN — SODIUM CHLORIDE, PRESERVATIVE FREE 3 ML: 5 INJECTION INTRAVENOUS at 21:07

## 2019-02-27 RX ADMIN — ATORVASTATIN CALCIUM 10 MG: 10 TABLET, FILM COATED ORAL at 10:31

## 2019-02-27 RX ADMIN — ACETAMINOPHEN 650 MG: 325 TABLET ORAL at 05:27

## 2019-02-27 RX ADMIN — ACETAMINOPHEN 650 MG: 325 TABLET ORAL at 01:06

## 2019-02-27 RX ADMIN — ASPIRIN 81 MG: 81 TABLET, COATED ORAL at 10:32

## 2019-02-27 RX ADMIN — LOSARTAN POTASSIUM 50 MG: 25 TABLET, FILM COATED ORAL at 10:33

## 2019-02-27 RX ADMIN — HEPARIN SODIUM 5000 UNITS: 5000 INJECTION INTRAVENOUS; SUBCUTANEOUS at 21:06

## 2019-02-27 RX ADMIN — ACETAMINOPHEN 650 MG: 325 TABLET ORAL at 11:57

## 2019-02-27 RX ADMIN — SERTRALINE HYDROCHLORIDE 25 MG: 50 TABLET ORAL at 10:32

## 2019-02-27 RX ADMIN — POLYETHYLENE GLYCOL 3350 17 G: 17 POWDER, FOR SOLUTION ORAL at 10:31

## 2019-02-27 RX ADMIN — HEPARIN SODIUM 5000 UNITS: 5000 INJECTION INTRAVENOUS; SUBCUTANEOUS at 10:33

## 2019-02-28 LAB
DEPRECATED RDW RBC AUTO: 53.5 FL (ref 37–54)
ERYTHROCYTE [DISTWIDTH] IN BLOOD BY AUTOMATED COUNT: 14.8 % (ref 11.3–14.5)
HCT VFR BLD AUTO: 24.9 % (ref 38.9–50.9)
HGB BLD-MCNC: 7.6 G/DL (ref 13.1–17.5)
MCH RBC QN AUTO: 30.3 PG (ref 27–31)
MCHC RBC AUTO-ENTMCNC: 30.5 G/DL (ref 32–36)
MCV RBC AUTO: 99.2 FL (ref 80–99)
PLATELET # BLD AUTO: 281 10*3/MM3 (ref 150–450)
PMV BLD AUTO: 9.5 FL (ref 6–12)
RBC # BLD AUTO: 2.51 10*6/MM3 (ref 4.2–5.76)
WBC NRBC COR # BLD: 10.34 10*3/MM3 (ref 3.5–10.8)

## 2019-02-28 PROCEDURE — 85027 COMPLETE CBC AUTOMATED: CPT | Performed by: INTERNAL MEDICINE

## 2019-02-28 PROCEDURE — 25010000002 HEPARIN (PORCINE) PER 1000 UNITS: Performed by: ORTHOPAEDIC SURGERY

## 2019-02-28 PROCEDURE — 99232 SBSQ HOSP IP/OBS MODERATE 35: CPT | Performed by: INTERNAL MEDICINE

## 2019-02-28 RX ADMIN — HEPARIN SODIUM 5000 UNITS: 5000 INJECTION INTRAVENOUS; SUBCUTANEOUS at 08:35

## 2019-02-28 RX ADMIN — SERTRALINE HYDROCHLORIDE 25 MG: 50 TABLET ORAL at 08:35

## 2019-02-28 RX ADMIN — POLYETHYLENE GLYCOL 3350 17 G: 17 POWDER, FOR SOLUTION ORAL at 10:00

## 2019-02-28 RX ADMIN — ATORVASTATIN CALCIUM 10 MG: 10 TABLET, FILM COATED ORAL at 08:35

## 2019-02-28 RX ADMIN — ACETAMINOPHEN 650 MG: 325 TABLET ORAL at 00:29

## 2019-02-28 RX ADMIN — METOPROLOL TARTRATE 50 MG: 50 TABLET ORAL at 08:35

## 2019-02-28 RX ADMIN — ACETAMINOPHEN 650 MG: 325 TABLET ORAL at 05:53

## 2019-02-28 RX ADMIN — ASPIRIN 81 MG: 81 TABLET, COATED ORAL at 08:35

## 2019-02-28 RX ADMIN — SODIUM CHLORIDE, PRESERVATIVE FREE 3 ML: 5 INJECTION INTRAVENOUS at 08:35

## 2019-02-28 RX ADMIN — ACETAMINOPHEN 650 MG: 325 TABLET ORAL at 18:04

## 2019-02-28 RX ADMIN — HEPARIN SODIUM 5000 UNITS: 5000 INJECTION INTRAVENOUS; SUBCUTANEOUS at 20:42

## 2019-02-28 RX ADMIN — SODIUM CHLORIDE, PRESERVATIVE FREE 3 ML: 5 INJECTION INTRAVENOUS at 20:42

## 2019-02-28 RX ADMIN — LOSARTAN POTASSIUM 50 MG: 25 TABLET, FILM COATED ORAL at 08:35

## 2019-02-28 RX ADMIN — DONEPEZIL HYDROCHLORIDE 10 MG: 10 TABLET, FILM COATED ORAL at 20:41

## 2019-02-28 RX ADMIN — ACETAMINOPHEN 650 MG: 325 TABLET ORAL at 12:10

## 2019-02-28 RX ADMIN — AMLODIPINE BESYLATE 5 MG: 5 TABLET ORAL at 08:35

## 2019-03-01 VITALS
DIASTOLIC BLOOD PRESSURE: 49 MMHG | BODY MASS INDEX: 26.52 KG/M2 | TEMPERATURE: 99.2 F | HEIGHT: 68 IN | WEIGHT: 175 LBS | HEART RATE: 66 BPM | RESPIRATION RATE: 18 BRPM | SYSTOLIC BLOOD PRESSURE: 107 MMHG | OXYGEN SATURATION: 99 %

## 2019-03-01 PROBLEM — N39.0 ACUTE UTI (URINARY TRACT INFECTION): Status: RESOLVED | Noted: 2019-02-21 | Resolved: 2019-03-01

## 2019-03-01 PROCEDURE — 25010000002 HEPARIN (PORCINE) PER 1000 UNITS: Performed by: ORTHOPAEDIC SURGERY

## 2019-03-01 PROCEDURE — 99239 HOSP IP/OBS DSCHRG MGMT >30: CPT | Performed by: NURSE PRACTITIONER

## 2019-03-01 RX ORDER — PSEUDOEPHEDRINE HCL 30 MG
100 TABLET ORAL DAILY PRN
Start: 2019-03-01

## 2019-03-01 RX ORDER — LORAZEPAM 0.5 MG/1
0.5 TABLET ORAL NIGHTLY PRN
Qty: 3 TABLET | Refills: 0 | Status: SHIPPED | OUTPATIENT
Start: 2019-03-01 | End: 2019-03-01

## 2019-03-01 RX ORDER — LOSARTAN POTASSIUM 50 MG/1
50 TABLET ORAL
Start: 2019-03-02 | End: 2019-03-23 | Stop reason: HOSPADM

## 2019-03-01 RX ORDER — LORAZEPAM 0.5 MG/1
0.5 TABLET ORAL NIGHTLY PRN
Qty: 3 TABLET | Refills: 0 | Status: ON HOLD | OUTPATIENT
Start: 2019-03-01 | End: 2019-03-23 | Stop reason: SDUPTHER

## 2019-03-01 RX ADMIN — ACETAMINOPHEN 650 MG: 325 TABLET ORAL at 00:03

## 2019-03-01 RX ADMIN — HEPARIN SODIUM 5000 UNITS: 5000 INJECTION INTRAVENOUS; SUBCUTANEOUS at 08:42

## 2019-03-01 RX ADMIN — POLYETHYLENE GLYCOL 3350 17 G: 17 POWDER, FOR SOLUTION ORAL at 08:42

## 2019-03-01 RX ADMIN — METOPROLOL TARTRATE 50 MG: 50 TABLET ORAL at 08:43

## 2019-03-01 RX ADMIN — LOSARTAN POTASSIUM 50 MG: 25 TABLET, FILM COATED ORAL at 08:43

## 2019-03-01 RX ADMIN — ATORVASTATIN CALCIUM 10 MG: 10 TABLET, FILM COATED ORAL at 08:43

## 2019-03-01 RX ADMIN — SERTRALINE HYDROCHLORIDE 25 MG: 50 TABLET ORAL at 08:43

## 2019-03-01 RX ADMIN — ASPIRIN 81 MG: 81 TABLET, COATED ORAL at 08:43

## 2019-03-01 RX ADMIN — ACETAMINOPHEN 650 MG: 325 TABLET ORAL at 05:15

## 2019-03-01 RX ADMIN — AMLODIPINE BESYLATE 5 MG: 5 TABLET ORAL at 08:43

## 2019-03-01 NOTE — DISCHARGE SUMMARY
[facility] Medicine Services  DISCHARGE SUMMARY    Patient Name: Tee Florse  : 1927  MRN: [id number]    Date of Admission: 2019  Date of Discharge:  3/1/19  Primary Care Physician: Deniz Strong MD    Consults     Date and Time Order Name Status Description    2019 Inpatient Orthopedic Surgery Consult            Hospital Course     Presenting Problem:   Closed left hip fracture (CMS/HCC) [S72.002A]    Active Hospital Problems    Diagnosis Date Noted   • **Closed left hip fracture (CMS/HCC) [S72.002A] 2019   • Dementia [F03.90] 2019   • Hypertension [I10] 2019      Resolved Hospital Problems    Diagnosis Date Noted Date Resolved   • Acute UTI (urinary tract infection) [N39.0] 2019          Hospital Course:  Tee Flores is a 89 y.o. male  with hx of severe dementia and hypertension who presented from his nursing facility with a fall found to have left hip fracture. He is s/p left hip hemiarthroplasty on 2019.  Patient may shower with bandage removal.  He is to follow-up with orthopedics in 1 month.  Continue DVT prophylaxis during this time.  Continue physical and occupational therapy as tolerates.    Patient also found to have MRSA UTI and is status post vancomycin infusions.  He is back to baseline mental status.  He has been severely weak and did not qualify for acute rehab and will be sent to [facility] for ongoing therapy services.    He did have noted acute blood loss anemia with significant drop in hemoglobin/hematocrit postsurgical.  Hemoglobin appears to be stable now would recommend follow-up hemoglobin/hematocrit in 1 week or as needed for symptoms.  Patient to be discharged today with the below follow-up.        Discharge Follow Up Recommendations for labs/diagnostics:  Follow-up with primary care 1 week after discharge from rehab  Follow-up with Dr. Baez in 1 month.  Continue DVT  prophylaxis with Lovenox subcu times 1 month (through 3/20/2019)    Day of Discharge     HPI:   Vision to at baseline dementia, pleasantly confused.  Family at bedside and states he is perkier today.  He has no complaints and is eating breakfast with out nausea or vomiting.  He denies pain currently.    Review of Systems  Gen- No fevers, chills  CV- No chest pain, palpitations  Resp- No cough, dyspnea  GI- No N/V/D, abd pain      Otherwise ROS is negative except as mentioned in the HPI.    Vital Signs:   Temp:  [97.8 °F (36.6 °C)-98.6 °F (37 °C)] 98.2 °F (36.8 °C)  Heart Rate:  [63-74] 66  Resp:  [16-18] 16  BP: (117-149)/(57-68) 146/63     Physical Exam:  Constitutional: No acute distress, awake, alert  HENT: NCAT, mucous membranes moist  Respiratory: Clear to auscultation bilaterally, respiratory effort normal   Cardiovascular: RRR, no murmurs, rubs, or gallops, palpable pedal pulses bilaterally  Gastrointestinal: Positive bowel sounds, soft, nontender, nondistended  Musculoskeletal: No bilateral ankle edema  Psychiatric: Appropriate affect, cooperative  Neurologic: Oriented x 1, strength symmetric in all extremities, Cranial Nerves grossly intact to confrontation, speech clear  Skin: No rashes- left hip incision CDI without erythema      Pertinent  and/or Most Recent Results     Results from last 7 days   Lab Units 02/28/19  0839 02/27/19  0608 02/24/19  0815 02/23/19  0900 02/22/19  1150   WBC 10*3/mm3 10.34 11.80*  --   --  7.27   HEMOGLOBIN g/dL 7.6* 7.7*  --  8.4* 8.0*   HEMATOCRIT % 24.9* 25.0*  --  26.4* 25.3*   PLATELETS 10*3/mm3 281 279  --   --  150   SODIUM mmol/L  --   --  139  --  135   POTASSIUM mmol/L  --   --  4.2  --  3.9   CHLORIDE mmol/L  --   --  105  --  103   CO2 mmol/L  --   --  25.0  --  25.0   BUN mg/dL  --   --  19  --  17   CREATININE mg/dL  --   --  0.80  --  0.80   GLUCOSE mg/dL  --   --  104*  --  102*   CALCIUM mg/dL  --   --  7.7*  --  7.9*           Invalid input(s): PROT,  LABALBU        Invalid input(s): TG, LDLCALC, LDLREALC        Brief Urine Lab Results  (Last result in the past 365 days)      Color   Clarity   Blood   Leuk Est   Nitrite   Protein   CREAT   Urine HCG        02/20/19 1315 Yellow Clear Large (3+) Moderate (2+) Positive Trace               Microbiology Results Abnormal     Procedure Component Value - Date/Time    Blood Culture - Blood, Hand, Right [[id number]] Collected:  02/21/19 1505    Lab Status:  Final result Specimen:  Blood from Hand, Right Updated:  02/26/19 1530     Blood Culture No growth at 5 days    Blood Culture - Blood, Hand, Left [[id number]] Collected:  02/21/19 1505    Lab Status:  Final result Specimen:  Blood from Hand, Left Updated:  02/26/19 1530     Blood Culture No growth at 5 days    Urine Culture - Urine, Urine, Catheter [[id number]]  (Abnormal)  (Susceptibility) Collected:  02/20/19 1315    Lab Status:  Final result Specimen:  Urine, Catheter Updated:  02/22/19 1207     Urine Culture >100,000 CFU/mL Staphylococcus aureus, MRSA     Comment:   Methicillin resistant Staph aureus, patient may be an isolation risk.       Susceptibility      Staphylococcus aureus, MRSA     EUGENIO     Ciprofloxacin Resistant     Daptomycin Susceptible     Gentamicin Susceptible     Levofloxacin Resistant     Linezolid Susceptible     Nitrofurantoin Susceptible     Oxacillin Resistant     Penicillin G Resistant     Quinupristin + Dalfopristin Susceptible     Rifampin Susceptible     Tetracycline Resistant     Trimethoprim + Sulfamethoxazole Resistant     Vancomycin Susceptible                          Imaging Results (all)     Procedure Component Value Units Date/Time    XR Hip With or Without Pelvis 1 View Left [[id number]] Collected:  02/21/19 0942     Updated:  02/21/19 1258    Narrative:       EXAMINATION: XR HIP W OR WO PELVIS 1 VIEW LEFT- 02/20/2019     INDICATION: postop left hip hemiarthroplasty      COMPARISON: 02/20/2019     FINDINGS: Imaging of the pelvis and 2  views of the left hip reveal  patient to be status post left hip arthroplasty. Postsurgical changes  seen in the soft tissues. No hardware complication or malalignment  identified of the prosthesis.           Impression:       Status post hemiarthroplasty of the left hip with no  hardware complication or malalignment identified of the prosthesis.     D:  02/21/2019  E:  02/21/2019     This report was finalized on 2/21/2019 12:56 PM by Dr. Tiny Lange MD.       CT Head Without Contrast [[id number]] Collected:  02/20/19 1644     Updated:  02/20/19 1647    Narrative:       EXAMINATION: CT HEAD WO CONTRAST-02/20/2019:      INDICATION: Fall, AMS.         TECHNIQUE: CT scan of the head was performed at 5 mm intervals. No  intravenous contrast was utilized.     The radiation dose reduction device was turned on for each scan per the  ALARA (As Low as Reasonably Achievable) protocol.     COMPARISON: 08/16/2018.     FINDINGS: There is central and cortical atrophy. 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 There is no intracranial mass, hemorrhage, midline  shift or extra-axial fluid collection.       Impression:       There are no acute findings. 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     D:  02/20/2019  E:  02/20/2019     This report was finalized on 2/20/2019 4:45 PM by Dr. Brian Jasmine MD.       CT Pelvis Without Contrast [[id number]] Collected:  02/20/19 1615     Updated:  02/20/19 1646    Narrative:       EXAMINATION: CT PELVIS WO CONTRAST-      INDICATION: trauma, left hip fracture         TECHNIQUE: CT scan of the pelvis was performed in the axial plane and  displayed in the axial as well as reconstructed coronal and sagittal  projections.     The radiation dose reduction device was turned on for each scan per the  ALARA (As Low as Reasonably Achievable) protocol.     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 There is transcervical fracture of the  left femur with mild distraction and lateral displacement and rotation  of the distal fragment. Lastly there is inflammatory change lateral to  the left rectus musculature with a small amount of associated air. There  is a linear density extending from this process superficially to the  left inguinal region, presumably representing a drainage tube..             Impression:       1. Fracture of the left femoral neck as described above in detail.  2. Inflammatory change and air lateral to the left rectus abdominal  musculature within which there appears to be a linear radiopaque drain  or tube        This report was finalized on 2/20/2019 4:44 PM by Dr. Brian Jasmine MD.       XR Chest 1 View [[id number]] Collected:  02/20/19 1327     Updated:  02/20/19 1642    Narrative:       EXAMINATION: XR CHEST 1 VW-02/20/2019:      INDICATION: Hip fracture, coronary artery disease.      COMPARISON: 03/30/2014.     FINDINGS: There is a normal cardiac silhouette. There is no pulmonary  inflammatory process, mass or effusion.           Impression:       No active disease.     D:  02/20/2019  E:  02/20/2019     This report was finalized on 2/20/2019 4:40 PM by Dr. Brian Jasmine MD.       XR Hip With or Without Pelvis 2 - 3 View Left [[id number]] Collected:  02/20/19 1327     Updated:  02/20/19 1642    Narrative:       EXAMINATION: AP VIEW OF THE PELVIS AND OBLIQUE VIEW OF THE LEFT  HIP-02/20/2019:     HISTORY: Trauma.     FINDINGS: The bony pelvis is intact. There is transcervical fracture of  the left femoral neck. There is no dislocation. The right hip is intact.       Impression:       Comminuted transcervical fracture of the left femur.     D:  02/20/2019  E:  02/20/2019     This report was finalized on 2/20/2019 4:40 PM by Dr. Brian Jasmine MD.                              Discharge Details        Discharge Medications      New Medications      Instructions Start Date    enoxaparin 30 MG/0.3ML solution syringe  Commonly known as:  LOVENOX   30 mg, Subcutaneous, Every 12 Hours Scheduled         Changes to Medications      Instructions Start Date   docusate sodium 100 MG capsule  What changed:    · when to take this  · reasons to take this   100 mg, Oral, Daily PRN      losartan 50 MG tablet  Commonly known as:  COZAAR  What changed:    · how much to take  · how to take this  · when to take this   50 mg, Oral, Every 24 Hours Scheduled         Continue These Medications      Instructions Start Date   acetaminophen 325 MG tablet  Commonly known as:  TYLENOL   650 mg, Oral, Every 4 Hours PRN      amLODIPine 5 MG tablet  Commonly known as:  NORVASC   5 mg, Oral, Daily      aspirin 81 MG EC tablet   81 mg, Oral, Daily      bismuth subsalicylate 262 MG/15ML suspension  Commonly known as:  PEPTO BISMOL   30 mL, Oral, Every 4 Hours PRN      donepezil 10 MG tablet  Commonly known as:  ARICEPT   10 mg, Oral, Nightly      LORazepam 0.5 MG tablet  Commonly known as:  ATIVAN   0.5 mg, Oral, Nightly PRN      magnesium hydroxide 400 MG/5ML suspension  Commonly known as:  MILK OF MAGNESIA   30 mL, Oral, Every 12 Hours PRN      metoprolol tartrate 50 MG tablet  Commonly known as:  LOPRESSOR   50 mg, Oral, Daily      sertraline 25 MG tablet  Commonly known as:  ZOLOFT   25 mg, Oral, Daily      simvastatin 20 MG tablet  Commonly known as:  ZOCOR   20 mg, Oral, Daily      vitamin D 92676 units capsule capsule  Commonly known as:  ERGOCALCIFEROL   50,000 Units, Oral, Weekly         Stop These Medications    cimetidine 200 MG tablet  Commonly known as:  TAGAMET     risperiDONE 0.25 MG tablet  Commonly known as:  risperDAL            No Known Allergies      Discharge Disposition:  Skilled Nursing Facility (DC - External)    Discharge Diet:  Diet Order   Procedures   • Diet Dysphagia; III - Pureed With Some Mashed; Thin         Discharge Activity:   Activity Instructions     Activity as Tolerated             Special Instructions:      CODE STATUS:    Code Status and Medical Interventions:   Ordered at: 02/20/19 1421     Level Of Support Discussed With:    Next of Kin (If No Surrogate)     Code Status:    CPR     Medical Interventions (Level of Support Prior to Arrest):    Full         No future appointments.    Additional Instructions for the Follow-ups that You Need to Schedule     Discharge Follow-up with PCP   As directed       Currently Documented PCP:    Deniz Strong MD    PCP Phone Number:    [phone]     Follow Up Details:  1 week post dc from rehab         Discharge Follow-up with Specified Provider: Dr. Baez; 1 Month   As directed      To:  Dr. Baez    Follow Up:  1 Month               Time Spent on Discharge:  37 minutes    Electronically signed by AYE Rowley, 03/01/19, 10:25 AM.

## 2019-03-01 NOTE — PROGRESS NOTES
Case Management Discharge Note    Final Note: Mr. Flores was referred to [facility] for a skilled bed, but was denied for rehab admission after peer to peer completed with Dr. Gramajo yesterday.  Family is agreeable to a self pay long term care bed at [facility] and plan is to transfer to facility today.  [facility] ambulance scheduled for today at 1pm.  PCS form in the chart.  Please call report to [facility] at ph [phone] and ask for [address].  CM will fax DC summary to fax [phone].  Please have a copy of the DC summary and any hard scripts in the DC packet.  Thank you.    Destination - Selection Complete      Service Provider Request Status Selected Services Address Phone Number Fax Number    [facility] [address] [facility] [id number] [phone]      Durable Medical Equipment      No service has been selected for the patient.      Dialysis/Infusion      No service has been selected for the patient.      Home Medical Care      No service has been selected for the patient.      Community Resources      No service has been selected for the patient.        Ambulance: [facility]    Final Discharge Disposition Code: 04 - intermediate care facility

## 2019-03-01 NOTE — DISCHARGE PLACEMENT REQUEST
"Augie Desouza (89 y.o. Male)     Date of Birth Social Security Number Address Home Phone MRN    [id number]  [address]  [facility] [address] [phone] [id number]    Gnosticism Marital Status          None        Admission Date Admission Type Admitting Provider Attending Provider Department, Room/Bed    19 Emergency Maxine Gramajo MD Seward, Kathryn L, MD [facility] 3G, S352/1    Discharge Date Discharge Disposition Discharge Destination         Skilled Nursing Facility (DC - External)              Attending Provider:  Maxine Gramajo MD    Allergies:  No Known Allergies    Isolation:  None   Infection:  MRSA (19)   Code Status:  CPR    Ht:  172.7 cm (68\")   Wt:  79.4 kg (175 lb)    Admission Cmt:  None   Principal Problem:  Closed left hip fracture (CMS/[facility]) [S72.002A]                 Active Insurance as of 2019     Primary Coverage     Payor Plan Insurance Group Employer/Plan Group    HUMANA MEDICARE REPLACEMENT HUMANA MEDICARE REPL [id number]     Payor Plan Address Payor Plan Phone Number Payor Plan Fax Number Effective Dates    [address] [address] [phone]  2018 - None Entered    [facility] [id number]       Subscriber Name Subscriber Birth Date Member ID       AUGIE DESOUZA 1927 [id number]                 Emergency Contacts      (Rel.) Home Phone Work Phone Mobile Phone    Augie Desouza (Son) -- -- [phone]    Elenita Desouza (Grandchild) -- -- [phone]    Sidney Desouza (Son) [phone] -- --               Discharge Summary      Rosalva Monterroso, APRN at 3/1/2019 10:25 AM              [facility] Medicine Services  DISCHARGE SUMMARY    Patient Name: Augie Desouza  : 1927  MRN: [id number]    Date of Admission: 2019  Date of Discharge:  3/1/19  Primary Care Physician: Deniz Strong MD    Consults     Date and Time Order Name Status Description    2019 2005 Inpatient " Orthopedic Surgery Consult            Hospital Course     Presenting Problem:   Closed left hip fracture (CMS/[facility]) [S72.002A]    Active Hospital Problems    Diagnosis Date Noted   • **Closed left hip fracture (CMS/[facility]) [S72.002A] 02/20/2019   • Dementia [F03.90] 02/20/2019   • Hypertension [I10] 02/20/2019      Resolved Hospital Problems    Diagnosis Date Noted Date Resolved   • Acute UTI (urinary tract infection) [N39.0] 02/21/2019 03/01/2019          Hospital Course:  Tee Flores is a 89 y.o. male  with hx of severe dementia and hypertension who presented from his nursing facility with a fall found to have left hip fracture. He is s/p left hip hemiarthroplasty on 2/20/2019.  Patient may shower with bandage removal.  He is to follow-up with orthopedics in 1 month.  Continue DVT prophylaxis during this time.  Continue physical and occupational therapy as tolerates.    Patient also found to have MRSA UTI and is status post vancomycin infusions.  He is back to baseline mental status.  He has been severely weak and did not qualify for acute rehab and will be sent to [facility] for ongoing therapy services.    He did have noted acute blood loss anemia with significant drop in hemoglobin/hematocrit postsurgical.  Hemoglobin appears to be stable now would recommend follow-up hemoglobin/hematocrit in 1 week or as needed for symptoms.  Patient to be discharged today with the below follow-up.        Discharge Follow Up Recommendations for labs/diagnostics:  Follow-up with primary care 1 week after discharge from rehab  Follow-up with Dr. Baez in 1 month.  Continue DVT prophylaxis with Lovenox subcu times 1 month (through 3/20/2019)    Day of Discharge     HPI:   Vision to at baseline dementia, pleasantly confused.  Family at bedside and states he is perkier today.  He has no complaints and is eating breakfast with out nausea or vomiting.  He denies pain currently.    Review of Systems  Gen- No fevers,  chills  CV- No chest pain, palpitations  Resp- No cough, dyspnea  GI- No N/V/D, abd pain      Otherwise ROS is negative except as mentioned in the HPI.    Vital Signs:   Temp:  [97.8 °F (36.6 °C)-98.6 °F (37 °C)] 98.2 °F (36.8 °C)  Heart Rate:  [63-74] 66  Resp:  [16-18] 16  BP: (117-149)/(57-68) 146/63     Physical Exam:  Constitutional: No acute distress, awake, alert  HENT: NCAT, mucous membranes moist  Respiratory: Clear to auscultation bilaterally, respiratory effort normal   Cardiovascular: RRR, no murmurs, rubs, or gallops, palpable pedal pulses bilaterally  Gastrointestinal: Positive bowel sounds, soft, nontender, nondistended  Musculoskeletal: No bilateral ankle edema  Psychiatric: Appropriate affect, cooperative  Neurologic: Oriented x 1, strength symmetric in all extremities, Cranial Nerves grossly intact to confrontation, speech clear  Skin: No rashes- left hip incision CDI without erythema      Pertinent  and/or Most Recent Results     Results from last 7 days   Lab Units 02/28/19  0839 02/27/19  0608 02/24/19  0815 02/23/19  0900 02/22/19  1150   WBC 10*3/mm3 10.34 11.80*  --   --  7.27   HEMOGLOBIN g/dL 7.6* 7.7*  --  8.4* 8.0*   HEMATOCRIT % 24.9* 25.0*  --  26.4* 25.3*   PLATELETS 10*3/mm3 281 279  --   --  150   SODIUM mmol/L  --   --  139  --  135   POTASSIUM mmol/L  --   --  4.2  --  3.9   CHLORIDE mmol/L  --   --  105  --  103   CO2 mmol/L  --   --  25.0  --  25.0   BUN mg/dL  --   --  19  --  17   CREATININE mg/dL  --   --  0.80  --  0.80   GLUCOSE mg/dL  --   --  104*  --  102*   CALCIUM mg/dL  --   --  7.7*  --  7.9*           Invalid input(s): PROT, LABALBU        Invalid input(s): TG, LDLCALC, LDLREALC        Brief Urine Lab Results  (Last result in the past 365 days)      Color   Clarity   Blood   Leuk Est   Nitrite   Protein   CREAT   Urine HCG        02/20/19 1315 Yellow Clear Large (3+) Moderate (2+) Positive Trace               Microbiology Results Abnormal     Procedure Component  Value - Date/Time    Blood Culture - Blood, Hand, Right [[id number]] Collected:  02/21/19 1505    Lab Status:  Final result Specimen:  Blood from Hand, Right Updated:  02/26/19 1530     Blood Culture No growth at 5 days    Blood Culture - Blood, Hand, Left [[id number]] Collected:  02/21/19 1505    Lab Status:  Final result Specimen:  Blood from Hand, Left Updated:  02/26/19 1530     Blood Culture No growth at 5 days    Urine Culture - Urine, Urine, Catheter [[id number]]  (Abnormal)  (Susceptibility) Collected:  02/20/19 1315    Lab Status:  Final result Specimen:  Urine, Catheter Updated:  02/22/19 1207     Urine Culture >100,000 CFU/mL Staphylococcus aureus, MRSA     Comment:   Methicillin resistant Staph aureus, patient may be an isolation risk.       Susceptibility      Staphylococcus aureus, MRSA     EUGENIO     Ciprofloxacin Resistant     Daptomycin Susceptible     Gentamicin Susceptible     Levofloxacin Resistant     Linezolid Susceptible     Nitrofurantoin Susceptible     Oxacillin Resistant     Penicillin G Resistant     Quinupristin + Dalfopristin Susceptible     Rifampin Susceptible     Tetracycline Resistant     Trimethoprim + Sulfamethoxazole Resistant     Vancomycin Susceptible                          Imaging Results (all)     Procedure Component Value Units Date/Time    XR Hip With or Without Pelvis 1 View Left [[id number]] Collected:  02/21/19 0942     Updated:  02/21/19 1258    Narrative:       EXAMINATION: XR HIP W OR WO PELVIS 1 VIEW LEFT- 02/20/2019     INDICATION: postop left hip hemiarthroplasty      COMPARISON: 02/20/2019     FINDINGS: Imaging of the pelvis and 2 views of the left hip reveal  patient to be status post left hip arthroplasty. Postsurgical changes  seen in the soft tissues. No hardware complication or malalignment  identified of the prosthesis.           Impression:       Status post hemiarthroplasty of the left hip with no  hardware complication or malalignment identified of the  prosthesis.     D:  02/21/2019  E:  02/21/2019     This report was finalized on 2/21/2019 12:56 PM by Dr. Tiny Lange MD.       CT Head Without Contrast [[id number]] Collected:  02/20/19 1644     Updated:  02/20/19 1647    Narrative:       EXAMINATION: CT HEAD WO CONTRAST-02/20/2019:      INDICATION: Fall, AMS.         TECHNIQUE: CT scan of the head was performed at 5 mm intervals. No  intravenous contrast was utilized.     The radiation dose reduction device was turned on for each scan per the  ALARA (As Low as Reasonably Achievable) protocol.     COMPARISON: 08/16/2018.     FINDINGS: There is central and cortical atrophy. 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 There is no intracranial mass, hemorrhage, midline  shift or extra-axial fluid collection.       Impression:       There are no acute findings. 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     D:  02/20/2019  E:  02/20/2019     This report was finalized on 2/20/2019 4:45 PM by Dr. Brian Jasmine MD.       CT Pelvis Without Contrast [[id number]] Collected:  02/20/19 1615     Updated:  02/20/19 1646    Narrative:       EXAMINATION: CT PELVIS WO CONTRAST-      INDICATION: trauma, left hip fracture         TECHNIQUE: CT scan of the pelvis was performed in the axial plane and  displayed in the axial as well as reconstructed coronal and sagittal  projections.     The radiation dose reduction device was turned on for each scan per the  ALARA (As Low as Reasonably Achievable) protocol.     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 There is transcervical fracture of the  left femur with mild distraction and lateral displacement and rotation  of the distal fragment. Lastly there is inflammatory change lateral to  the left rectus musculature with a small amount of associated air. There  is a linear density extending from this process superficially  to the  left inguinal region, presumably representing a drainage tube..             Impression:       1. Fracture of the left femoral neck as described above in detail.  2. Inflammatory change and air lateral to the left rectus abdominal  musculature within which there appears to be a linear radiopaque drain  or tube        This report was finalized on 2/20/2019 4:44 PM by Dr. Brian Jasmine MD.       XR Chest 1 View [[id number]] Collected:  02/20/19 1327     Updated:  02/20/19 1642    Narrative:       EXAMINATION: XR CHEST 1 VW-02/20/2019:      INDICATION: Hip fracture, coronary artery disease.      COMPARISON: 03/30/2014.     FINDINGS: There is a normal cardiac silhouette. There is no pulmonary  inflammatory process, mass or effusion.           Impression:       No active disease.     D:  02/20/2019  E:  02/20/2019     This report was finalized on 2/20/2019 4:40 PM by Dr. Brian Jasmine MD.       XR Hip With or Without Pelvis 2 - 3 View Left [[id number]] Collected:  02/20/19 1327     Updated:  02/20/19 1642    Narrative:       EXAMINATION: AP VIEW OF THE PELVIS AND OBLIQUE VIEW OF THE LEFT  HIP-02/20/2019:     HISTORY: Trauma.     FINDINGS: The bony pelvis is intact. There is transcervical fracture of  the left femoral neck. There is no dislocation. The right hip is intact.       Impression:       Comminuted transcervical fracture of the left femur.     D:  02/20/2019  E:  02/20/2019     This report was finalized on 2/20/2019 4:40 PM by Dr. Brian Jasmine MD.                              Discharge Details        Discharge Medications      New Medications      Instructions Start Date   enoxaparin 30 MG/0.3ML solution syringe  Commonly known as:  LOVENOX   30 mg, Subcutaneous, Every 12 Hours Scheduled         Changes to Medications      Instructions Start Date   docusate sodium 100 MG capsule  What changed:    · when to take this  · reasons to take this   100 mg, Oral, Daily PRN      losartan 50 MG tablet  Commonly known  as:  COZAAR  What changed:    · how much to take  · how to take this  · when to take this   50 mg, Oral, Every 24 Hours Scheduled         Continue These Medications      Instructions Start Date   acetaminophen 325 MG tablet  Commonly known as:  TYLENOL   650 mg, Oral, Every 4 Hours PRN      amLODIPine 5 MG tablet  Commonly known as:  NORVASC   5 mg, Oral, Daily      aspirin 81 MG EC tablet   81 mg, Oral, Daily      bismuth subsalicylate 262 MG/15ML suspension  Commonly known as:  PEPTO BISMOL   30 mL, Oral, Every 4 Hours PRN      donepezil 10 MG tablet  Commonly known as:  ARICEPT   10 mg, Oral, Nightly      LORazepam 0.5 MG tablet  Commonly known as:  ATIVAN   0.5 mg, Oral, Nightly PRN      magnesium hydroxide 400 MG/5ML suspension  Commonly known as:  MILK OF MAGNESIA   30 mL, Oral, Every 12 Hours PRN      metoprolol tartrate 50 MG tablet  Commonly known as:  LOPRESSOR   50 mg, Oral, Daily      sertraline 25 MG tablet  Commonly known as:  ZOLOFT   25 mg, Oral, Daily      simvastatin 20 MG tablet  Commonly known as:  ZOCOR   20 mg, Oral, Daily      vitamin D 90315 units capsule capsule  Commonly known as:  ERGOCALCIFEROL   50,000 Units, Oral, Weekly         Stop These Medications    cimetidine 200 MG tablet  Commonly known as:  TAGAMET     risperiDONE 0.25 MG tablet  Commonly known as:  risperDAL            No Known Allergies      Discharge Disposition:  Skilled Nursing Facility (DC - External)    Discharge Diet:  Diet Order   Procedures   • Diet Dysphagia; III - Pureed With Some Mashed; Thin         Discharge Activity:   Activity Instructions     Activity as Tolerated            Special Instructions:      CODE STATUS:    Code Status and Medical Interventions:   Ordered at: 02/20/19 0087     Level Of Support Discussed With:    Next of Kin (If No Surrogate)     Code Status:    CPR     Medical Interventions (Level of Support Prior to Arrest):    Full         No future appointments.    Additional Instructions for the  Follow-ups that You Need to Schedule     Discharge Follow-up with PCP   As directed       Currently Documented PCP:    Deniz Strong MD    PCP Phone Number:    [phone]     Follow Up Details:  1 week post dc from rehab         Discharge Follow-up with Specified Provider: Dr. Baez; 1 Month   As directed      To:  Dr. Baez    Follow Up:  1 Month               Time Spent on Discharge:  37 minutes    Electronically signed by AYE Rowley, 03/01/19, 10:25 AM.        Electronically signed by Rosalva Monterroso APRN at 3/1/2019 10:30 AM

## 2019-03-01 NOTE — PLAN OF CARE
Problem: Patient Care Overview  Goal: Plan of Care Review  Outcome: Ongoing (interventions implemented as appropriate)   03/01/19 0354   Coping/Psychosocial   Plan of Care Reviewed With patient   Plan of Care Review   Progress no change   OTHER   Outcome Summary Pt has had an uneventful shift. Pt remains alert to self only. Pt has had no c/o's pain during shift. VSS throughout shift. Plan to d/c today. Will continue to monitor.        Problem: Fall Risk (Adult)  Goal: Identify Related Risk Factors and Signs and Symptoms  Outcome: Ongoing (interventions implemented as appropriate)    Goal: Absence of Fall  Outcome: Ongoing (interventions implemented as appropriate)      Problem: Fractured Hip (Adult)  Goal: Signs and Symptoms of Listed Potential Problems Will be Absent, Minimized or Managed (Fractured Hip)  Outcome: Ongoing (interventions implemented as appropriate)      Problem: Skin Injury Risk (Adult)  Goal: Identify Related Risk Factors and Signs and Symptoms  Outcome: Ongoing (interventions implemented as appropriate)    Goal: Skin Health and Integrity  Outcome: Ongoing (interventions implemented as appropriate)      Problem: Hip Arthroplasty (Total, Partial) (Adult)  Goal: Signs and Symptoms of Listed Potential Problems Will be Absent, Minimized or Managed (Hip Arthroplasty)  Outcome: Ongoing (interventions implemented as appropriate)

## 2019-03-21 ENCOUNTER — HOSPITAL ENCOUNTER (OUTPATIENT)
Facility: HOSPITAL | Age: 84
Setting detail: OBSERVATION
Discharge: NURSING, REHAB OR LONG-TERM CARE | End: 2019-03-23
Attending: EMERGENCY MEDICINE | Admitting: EMERGENCY MEDICINE

## 2019-03-21 DIAGNOSIS — Z78.9 IMPAIRED MOBILITY AND ADLS: ICD-10-CM

## 2019-03-21 DIAGNOSIS — Z74.09 IMPAIRED MOBILITY AND ADLS: ICD-10-CM

## 2019-03-21 DIAGNOSIS — D64.9 SYMPTOMATIC ANEMIA: Primary | ICD-10-CM

## 2019-03-21 DIAGNOSIS — Z74.09 IMPAIRED FUNCTIONAL MOBILITY, BALANCE, GAIT, AND ENDURANCE: ICD-10-CM

## 2019-03-21 PROBLEM — E78.5 HYPERLIPIDEMIA: Chronic | Status: ACTIVE | Noted: 2019-03-21

## 2019-03-21 PROBLEM — I95.9 HYPOTENSION: Status: ACTIVE | Noted: 2019-03-21

## 2019-03-21 PROBLEM — I25.10 CAD (CORONARY ARTERY DISEASE): Chronic | Status: ACTIVE | Noted: 2019-03-21

## 2019-03-21 LAB
ABO GROUP BLD: NORMAL
ANION GAP SERPL CALCULATED.3IONS-SCNC: 4 MMOL/L (ref 3–11)
APTT PPP: 27.7 SECONDS (ref 24–37)
BASOPHILS # BLD AUTO: 0.03 10*3/MM3 (ref 0–0.2)
BASOPHILS NFR BLD AUTO: 0.4 % (ref 0–1)
BLD GP AB SCN SERPL QL: NEGATIVE
BUN BLD-MCNC: 30 MG/DL (ref 9–23)
BUN/CREAT SERPL: 36.1 (ref 7–25)
CALCIUM SPEC-SCNC: 8.6 MG/DL (ref 8.7–10.4)
CHLORIDE SERPL-SCNC: 107 MMOL/L (ref 99–109)
CO2 SERPL-SCNC: 27 MMOL/L (ref 20–31)
CREAT BLD-MCNC: 0.83 MG/DL (ref 0.6–1.3)
DEPRECATED RDW RBC AUTO: 64.7 FL (ref 37–54)
DEVELOPER EXPIRATION DATE: ABNORMAL
DEVELOPER LOT NUMBER: ABNORMAL
EOSINOPHIL # BLD AUTO: 0.3 10*3/MM3 (ref 0–0.3)
EOSINOPHIL NFR BLD AUTO: 4.2 % (ref 0–3)
ERYTHROCYTE [DISTWIDTH] IN BLOOD BY AUTOMATED COUNT: 17.5 % (ref 11.3–14.5)
EXPIRATION DATE: ABNORMAL
FECAL OCCULT BLOOD SCREEN, POC: POSITIVE
GFR SERPL CREATININE-BSD FRML MDRD: 87 ML/MIN/1.73
GLUCOSE BLD-MCNC: 98 MG/DL (ref 70–100)
HCT VFR BLD AUTO: 25.1 % (ref 38.9–50.9)
HCT VFR BLD AUTO: 28 % (ref 38.9–50.9)
HGB BLD-MCNC: 7.2 G/DL (ref 13.1–17.5)
HGB BLD-MCNC: 8.3 G/DL (ref 13.1–17.5)
IMM GRANULOCYTES # BLD AUTO: 0.02 10*3/MM3 (ref 0–0.05)
IMM GRANULOCYTES NFR BLD AUTO: 0.3 % (ref 0–0.6)
INR PPP: 1.12 (ref 0.85–1.16)
IRON 24H UR-MRATE: 29 MCG/DL (ref 50–175)
IRON SATN MFR SERPL: 10 % (ref 20–50)
LDH SERPL-CCNC: 147 U/L (ref 120–246)
LYMPHOCYTES # BLD AUTO: 1.66 10*3/MM3 (ref 0.6–4.8)
LYMPHOCYTES NFR BLD AUTO: 23.2 % (ref 24–44)
Lab: ABNORMAL
MCH RBC QN AUTO: 29 PG (ref 27–31)
MCHC RBC AUTO-ENTMCNC: 28.7 G/DL (ref 32–36)
MCV RBC AUTO: 101.2 FL (ref 80–99)
MONOCYTES # BLD AUTO: 0.51 10*3/MM3 (ref 0–1)
MONOCYTES NFR BLD AUTO: 7.1 % (ref 0–12)
NEGATIVE CONTROL: NEGATIVE
NEUTROPHILS # BLD AUTO: 4.66 10*3/MM3 (ref 1.5–8.3)
NEUTROPHILS NFR BLD AUTO: 65.1 % (ref 41–71)
PLATELET # BLD AUTO: 308 10*3/MM3 (ref 150–450)
PMV BLD AUTO: 9.7 FL (ref 6–12)
POSITIVE CONTROL: POSITIVE
POTASSIUM BLD-SCNC: 4.4 MMOL/L (ref 3.5–5.5)
PROTHROMBIN TIME: 13.8 SECONDS (ref 11.2–14.3)
RBC # BLD AUTO: 2.48 10*6/MM3 (ref 4.2–5.76)
RETICS # AUTO: 0.21 10*6/MM3 (ref 0.02–0.13)
RETICS/RBC NFR AUTO: 8.39 % (ref 0.5–1.5)
RH BLD: POSITIVE
SODIUM BLD-SCNC: 138 MMOL/L (ref 132–146)
T&S EXPIRATION DATE: NORMAL
TIBC SERPL-MCNC: 292 MCG/DL (ref 250–450)
WBC NRBC COR # BLD: 7.16 10*3/MM3 (ref 3.5–10.8)

## 2019-03-21 PROCEDURE — 99220 PR INITIAL OBSERVATION CARE/DAY 70 MINUTES: CPT | Performed by: INTERNAL MEDICINE

## 2019-03-21 PROCEDURE — 86850 RBC ANTIBODY SCREEN: CPT | Performed by: EMERGENCY MEDICINE

## 2019-03-21 PROCEDURE — 83615 LACTATE (LD) (LDH) ENZYME: CPT | Performed by: INTERNAL MEDICINE

## 2019-03-21 PROCEDURE — 85610 PROTHROMBIN TIME: CPT | Performed by: EMERGENCY MEDICINE

## 2019-03-21 PROCEDURE — A9270 NON-COVERED ITEM OR SERVICE: HCPCS | Performed by: NURSE PRACTITIONER

## 2019-03-21 PROCEDURE — 99285 EMERGENCY DEPT VISIT HI MDM: CPT

## 2019-03-21 PROCEDURE — 85018 HEMOGLOBIN: CPT | Performed by: INTERNAL MEDICINE

## 2019-03-21 PROCEDURE — 80048 BASIC METABOLIC PNL TOTAL CA: CPT | Performed by: EMERGENCY MEDICINE

## 2019-03-21 PROCEDURE — 85014 HEMATOCRIT: CPT | Performed by: INTERNAL MEDICINE

## 2019-03-21 PROCEDURE — 85060 BLOOD SMEAR INTERPRETATION: CPT | Performed by: INTERNAL MEDICINE

## 2019-03-21 PROCEDURE — 85730 THROMBOPLASTIN TIME PARTIAL: CPT | Performed by: EMERGENCY MEDICINE

## 2019-03-21 PROCEDURE — 86901 BLOOD TYPING SEROLOGIC RH(D): CPT | Performed by: EMERGENCY MEDICINE

## 2019-03-21 PROCEDURE — 63710000001 DONEPEZIL 10 MG TABLET: Performed by: NURSE PRACTITIONER

## 2019-03-21 PROCEDURE — 96374 THER/PROPH/DIAG INJ IV PUSH: CPT

## 2019-03-21 PROCEDURE — 36430 TRANSFUSION BLD/BLD COMPNT: CPT

## 2019-03-21 PROCEDURE — G0378 HOSPITAL OBSERVATION PER HR: HCPCS

## 2019-03-21 PROCEDURE — P9016 RBC LEUKOCYTES REDUCED: HCPCS

## 2019-03-21 PROCEDURE — 83550 IRON BINDING TEST: CPT | Performed by: EMERGENCY MEDICINE

## 2019-03-21 PROCEDURE — 82270 OCCULT BLOOD FECES: CPT | Performed by: EMERGENCY MEDICINE

## 2019-03-21 PROCEDURE — 63710000001 LORAZEPAM 0.5 MG TABLET: Performed by: NURSE PRACTITIONER

## 2019-03-21 PROCEDURE — 86923 COMPATIBILITY TEST ELECTRIC: CPT

## 2019-03-21 PROCEDURE — 85045 AUTOMATED RETICULOCYTE COUNT: CPT | Performed by: INTERNAL MEDICINE

## 2019-03-21 PROCEDURE — 86900 BLOOD TYPING SEROLOGIC ABO: CPT | Performed by: EMERGENCY MEDICINE

## 2019-03-21 PROCEDURE — 85025 COMPLETE CBC W/AUTO DIFF WBC: CPT | Performed by: EMERGENCY MEDICINE

## 2019-03-21 PROCEDURE — 86900 BLOOD TYPING SEROLOGIC ABO: CPT

## 2019-03-21 PROCEDURE — 83540 ASSAY OF IRON: CPT | Performed by: EMERGENCY MEDICINE

## 2019-03-21 RX ORDER — LORAZEPAM 0.5 MG/1
0.5 TABLET ORAL NIGHTLY PRN
Status: DISCONTINUED | OUTPATIENT
Start: 2019-03-21 | End: 2019-03-23 | Stop reason: HOSPADM

## 2019-03-21 RX ORDER — PANTOPRAZOLE SODIUM 40 MG/10ML
40 INJECTION, POWDER, LYOPHILIZED, FOR SOLUTION INTRAVENOUS EVERY 12 HOURS SCHEDULED
Status: DISCONTINUED | OUTPATIENT
Start: 2019-03-21 | End: 2019-03-23 | Stop reason: HOSPADM

## 2019-03-21 RX ORDER — SODIUM CHLORIDE 0.9 % (FLUSH) 0.9 %
3 SYRINGE (ML) INJECTION EVERY 12 HOURS SCHEDULED
Status: DISCONTINUED | OUTPATIENT
Start: 2019-03-21 | End: 2019-03-23 | Stop reason: HOSPADM

## 2019-03-21 RX ORDER — ACETAMINOPHEN 325 MG/1
650 TABLET ORAL EVERY 4 HOURS PRN
Status: DISCONTINUED | OUTPATIENT
Start: 2019-03-21 | End: 2019-03-23 | Stop reason: HOSPADM

## 2019-03-21 RX ORDER — DONEPEZIL HYDROCHLORIDE 10 MG/1
10 TABLET, FILM COATED ORAL NIGHTLY
Status: DISCONTINUED | OUTPATIENT
Start: 2019-03-21 | End: 2019-03-23 | Stop reason: HOSPADM

## 2019-03-21 RX ORDER — SODIUM CHLORIDE 0.9 % (FLUSH) 0.9 %
3-10 SYRINGE (ML) INJECTION AS NEEDED
Status: DISCONTINUED | OUTPATIENT
Start: 2019-03-21 | End: 2019-03-23 | Stop reason: HOSPADM

## 2019-03-21 RX ORDER — FERROUS SULFATE 325(65) MG
325 TABLET ORAL
Status: ON HOLD | COMMUNITY
End: 2019-03-23 | Stop reason: SDUPTHER

## 2019-03-21 RX ORDER — SODIUM CHLORIDE 0.9 % (FLUSH) 0.9 %
10 SYRINGE (ML) INJECTION AS NEEDED
Status: DISCONTINUED | OUTPATIENT
Start: 2019-03-21 | End: 2019-03-23 | Stop reason: HOSPADM

## 2019-03-21 RX ADMIN — DONEPEZIL HYDROCHLORIDE 10 MG: 10 TABLET, FILM COATED ORAL at 21:29

## 2019-03-21 RX ADMIN — SODIUM CHLORIDE, PRESERVATIVE FREE 3 ML: 5 INJECTION INTRAVENOUS at 21:30

## 2019-03-21 RX ADMIN — LORAZEPAM 0.5 MG: 0.5 TABLET ORAL at 21:29

## 2019-03-21 RX ADMIN — PANTOPRAZOLE SODIUM 40 MG: 40 INJECTION, POWDER, FOR SOLUTION INTRAVENOUS at 18:45

## 2019-03-21 NOTE — H&P
[facility] Medicine Services  HISTORY AND PHYSICAL    Patient Name: Tee Flores  : 1927  MRN: [id number]  Primary Care Physician: Deniz Strong MD  Date of admission: 3/21/2019      Subjective   Subjective     Chief Complaint:  Anemia    HPI:  Tee Flores is a 89 y.o. male past medical history significant for advanced Alzheimer's dementia, hypertension, hyperlipidemia, CAD S/P CABG, CVA (doubted deficit).  Patient recently was admitted to [facility] from -3/1/19 secondary to a left hip fracture and UTI.  His urine culture came back positive MRSA and he was treated with vancomycin.  He went to surgery by  for a left hip hemiarthroplasty on .  He was treated with Lovenox for VT E prophylaxis of which he completed yesterday 3/20/19.  His last admission patient was noted to have postop anemia with a significant reduction in his H/H postop.  He was monitored with no transfusions required.  This was monitored on discharge when he returned to his skilled nursing facility at [facility].      Returns today secondary to anemia and was noted to have dark tarry stools and occult blood positive stools in the ER.  Hemoglobin 7.2.  He is pallorous and his blood pressure on presentation was 104/46.  Is unable to tell us if he is in pain or nausea due to his advanced dementia.  He appears comfortable resting back in bed in ER.  We will plan to admit to hospital medicine service.  We will plan to hold his blood pressure medication until stabilized.  Type and cross and plan to transfuse.  N.p.o. after midnight for GI to see tomorrow.    Review of Systems   Unable to assess due to AMS      Otherwise complete ROS reviewed and is negative except as mentioned in the HPI.    Personal History     Past Medical History:   Diagnosis Date   • Alzheimer disease    • Atherosclerosis of coronary artery bypass graft    • CAD (coronary artery disease)    •  Cerebral infarction (CMS/HCC)    • Dementia    • Hyperlipidemia    • Hypertension    • Stroke (CMS/HCC)     DATA DEFICIT        Past Surgical History:   Procedure Laterality Date   • CORONARY ARTERY BYPASS GRAFT     • HIP HEMIARTHROPLASTY Left 2019    Procedure: HIP HEMIARTHROPLASTY LEFT;  Surgeon: Mina Baez MD;  Location: [facility];  Service: Orthopedics       Family History: Family history is unknown by patient. Otherwise pertinent FHx was reviewed and unremarkable.     Social History:  reports that he has quit smoking. He smoked 2.00 packs per day. He has never used smokeless tobacco. He reports that he does not drink alcohol or use drugs.  Social History     Social History Narrative    Pt lives at [facility].  Advanced alz dementia.  Oriented to self only at baseline        Medications:    Available home medication information reviewed.  Medications Prior to Admission   Medication Sig Dispense Refill Last Dose   • amLODIPine (NORVASC) 5 MG tablet Take 5 mg by mouth Daily.   3/21/2019 at Unknown time   • aspirin 81 MG EC tablet Take 81 mg by mouth Daily.   3/21/2019 at Unknown time   • donepezil (ARICEPT) 10 MG tablet Take 10 mg by mouth Every Night.   3/20/2019 at Unknown time   • [] enoxaparin (LOVENOX) 30 MG/0.3ML solution syringe Inject 0.3 mL under the skin into the appropriate area as directed Every 12 (Twelve) Hours for 19 days. 8.4 mL  3/21/2019 at Unknown time   • ferrous sulfate 325 (65 FE) MG tablet Take 325 mg by mouth Daily With Breakfast.   3/21/2019 at Unknown time   • LORazepam (ATIVAN) 0.5 MG tablet Take 1 tablet by mouth At Night As Needed for Anxiety. 3 tablet 0 3/21/2019 at Unknown time   • losartan (COZAAR) 50 MG tablet Take 1 tablet by mouth Daily.   3/21/2019 at Unknown time   • metoprolol tartrate (LOPRESSOR) 50 MG tablet Take 50 mg by mouth Daily.   3/21/2019 at Unknown time   • sertraline (ZOLOFT) 25 MG tablet Take 25 mg by mouth Daily.    3/21/2019 at Unknown time   • simvastatin (ZOCOR) 20 MG tablet Take 20 mg by mouth Every Morning.   3/21/2019 at Unknown time   • vitamin D (ERGOCALCIFEROL) 01301 units capsule capsule Take 50,000 Units by mouth 1 (One) Time Per Week. On Thursdays   3/21/2019 at Unknown time   • acetaminophen (TYLENOL) 325 MG tablet Take 650 mg by mouth Every 4 (Four) Hours As Needed for Mild Pain .   Unknown at Unknown time   • bismuth subsalicylate (PEPTO BISMOL) 262 MG/15ML suspension Take 30 mL by mouth Every 4 (Four) Hours As Needed for Indigestion.   Unknown at Unknown time   • docusate sodium 100 MG capsule Take 100 mg by mouth Daily As Needed for Constipation.   Unknown at Unknown time   • magnesium hydroxide (MILK OF MAGNESIA) 400 MG/5ML suspension Take 30 mL by mouth Every 12 (Twelve) Hours As Needed for Constipation.   Unknown at Unknown time       No Known Allergies    Objective   Objective     Vital Signs:   Temp:  [98.7 °F (37.1 °C)] 98.7 °F (37.1 °C)  Heart Rate:  [79-92] 79  Resp:  [18] 18  BP: (104-144)/(46-66) 144/61        Physical Exam   Constitutional: No acute distress, awake, alert.  Lying back in bed in ER.  No visitors at bedside.  Eyes: PERRLA, sclerae anicteric, no conjunctival injection  HENT: NCAT, mucous membranes moist  Neck: Supple, no thyromegaly, no lymphadenopathy, trachea midline  Respiratory: Clear to auscultation bilaterally A/P decreased at bases, nonlabored respirations   Cardiovascular: RRR, no murmurs, rubs, or gallops, palpable pedal pulses bilaterally  Gastrointestinal: Positive bowel sounds, soft, nontender to palpation to all quadrants and epigastric region, nondistended.  No guarding or rebound.  Obese abdomen  Musculoskeletal: No bilateral ankle edema, no clubbing or cyanosis to extremities.    psychiatric: Appropriate affect, cooperative and calm   Neurologic: Alert and oriented to self only (at baseline per report), strength symmetric in all extremities, Cranial Nerves grossly intact  to confrontation, speech clear and appropriate.  Follows few simple commands   Skin: No rashes.  Pallor      Results Reviewed:  I have personally reviewed current lab, radiology, and data and agree.    Results from last 7 days   Lab Units 03/21/19  1620 03/21/19  1610   WBC 10*3/mm3  --  7.16   HEMOGLOBIN g/dL  --  7.2*   HEMATOCRIT %  --  25.1*   PLATELETS 10*3/mm3  --  308   INR  1.12  --      Results from last 7 days   Lab Units 03/21/19  1620   SODIUM mmol/L 138   POTASSIUM mmol/L 4.4   CHLORIDE mmol/L 107   CO2 mmol/L 27.0   BUN mg/dL 30*   CREATININE mg/dL 0.83   GLUCOSE mg/dL 98   CALCIUM mg/dL 8.6*     Estimated Creatinine Clearance: 65.1 mL/min (by C-G formula based on SCr of 0.83 mg/dL).  Brief Urine Lab Results  (Last result in the past 365 days)      Color   Clarity   Blood   Leuk Est   Nitrite   Protein   CREAT   Urine HCG        02/20/19 1315 Yellow Clear Large (3+) Moderate (2+) Positive Trace             No results found for: BNP  Imaging Results (last 24 hours)     ** No results found for the last 24 hours. **             Assessment/Plan   Assessment / Plan     Active Hospital Problems    Diagnosis Date Noted   • **Symptomatic anemia [D64.9] 03/21/2019   • Hyperlipidemia [E78.5] 03/21/2019   • CAD (coronary artery disease) [I25.10] 03/21/2019   • Hypotension [I95.9] 03/21/2019   • Hypertension [I10] 02/20/2019   • Dementia [F03.90] 02/20/2019   • Closed left hip fracture (CMS/HCC) [S72.002A] 02/20/2019     89-year-old  male who presents from SNF due to anemia with hemoglobin of 6.7 reported.  Advanced Alzheimer's dementia and unable to provide review of systems.  No visitors accompanying.  Noted to be pallorous in ER.  Stool was noted to be dark and tarry and positive for occult blood.  Blood pressure was hypotensive on presentation at 104/46.  He has been taking beta-blocker.  He is pleasant and calm on presentation.  Found to be anemic with hemoglobin of 7.2 hematocrit of 25.1.  Low iron.   Will admit to hospital medicine service and plan to transfuse.  N.p.o. after midnight.  Consult GI in a.m.    Assessment/Plan:    Anemia (postop versus other)  GIB  --s/p left hip hemiarthroplasty 2/20/19.  Noted significant blood loss postop likely a mix of intraoperative losses and delusional effect.  Charge back to SNF without having had blood transfusion.  Recommended close observation and monitoring.  --T&C and transfuse 1 unit, prepared 2.    --Add LDH, peripheral smear and reticulocyte site count to blood in lab-however suspect this is just poor rebound from postoperative anemia with a component of melena.  --PPI twice daily  --Clear liquids for now,NPO after midnight.  --Serial H and H q. 8  --Consult GI in a.m.-hope for an EGD.    Recent left hip fracture  S/p left hip hemiarthroplasty 2/20/19  --admit it here and went to surgery by Dr. Baez  --Was on Lovenox for VT E prophylaxis postop through yesterday 3/20/19  --PT/OT consult  --CM consult for discharge planning (patient lives at [facility] [place])  --No anticoagulant for now due to anemia    Advanced Alzheimer's dementia-questionable history of stroke which may explain his expressive aphasia  --Continue home meds  --Up with assist, turn  --CM for discharge planning    HTN/HLD  CAD  --We will hold metoprolol, Norvasc and losartan for now as he was hypotensive on presentation.  Plan to transfuse and will add back in cardiac meds at earliest opportunity    Hx CVA (data deficit)      DVT prophylaxis:    Teds/seqs  No a/c due to GIB/anemia    CODE STATUS:    Code Status and Medical Interventions:   Ordered at: 03/21/19 1812     Level Of Support Discussed With:    Patient     Code Status:    CPR     Medical Interventions (Level of Support Prior to Arrest):    Full       Electronically signed by AYE Bell, 03/21/19, 6:20 PM.      Brief Attending Note   I have seen and examined the patient, performing an independent face-to-face  diagnostic evaluation with plan of care reviewed and developed with the advanced practice clinician (APC).  Brief Summary Statement/HPI:   Delightful 89-year-old man was sent to the emergency room from the nursing home for anemia and questionable melena.  History is available in the chart patient is a very poor historian.  He did recently have a hip surgery and had postop anemia.  Further review of systems is limited by patient's expressive aphasia  Attending Physical Exam:  Patient is alert and talkative in no distress at rest--he is pale and very dry oral mucosa although reports gods not done with him yet and he is having a good day.  Neck is without mass or JVD  Heart is Reg wo murmur  Lungs are clear wo wheeze or crackle  Abd is soft without HSM or mass-not tender to palpation no rebound guarding no CVA tenderness  MAEW-slowly but on demand  Skin is without rash  Neurologic exam in nonfocal -except word salad-he is very interactive and slow to follow commands  Mood is appropriate  Data:  Labs and radiology studies have been reviewed.  Brief Assessment/Plan :      See above for further detailed assessment and plan developed with APC which I have reviewed and/or edited.      I believe this patient meets observation status for now    Electronically signed by Maxine Ferrell MD 03/22/19 12:53 AM

## 2019-03-21 NOTE — ED PROVIDER NOTES
Subjective   Tee Flores is a 89 y.o.male with a history of Alzheimer's disease who presents to the ED for anemia. The patient presents from [facility] because he has a hemoglobin of 6.7. The patient has difficulty speaking clearly and is unable to clearly indicate if he is in pain. He is able to deny feeling dizzy. His history is severely limited secondary to his advanced dementia.        History provided by:  Patient and EMS personnel  Illness   Location:  General  Quality:  Anemia  Severity:  Unable to specify  Onset quality:  Unable to specify  Duration: unable to specify.  Timing:  Constant  Progression:  Unable to specify  Chronicity:  New  Context:  Hemoglobin of 6.7 per [facility].       Review of Systems   Unable to perform ROS: Dementia   Neurological: Negative for dizziness.       Past Medical History:   Diagnosis Date   • Alzheimer disease    • Atherosclerosis of coronary artery bypass graft    • CAD (coronary artery disease)    • Cerebral infarction (CMS/[facility])    • Dementia    • Hyperlipidemia    • Hypertension    • Stroke (CMS/[facility])     DATA DEFICIT        No Known Allergies    Past Surgical History:   Procedure Laterality Date   • CORONARY ARTERY BYPASS GRAFT     • HIP HEMIARTHROPLASTY Left 2/20/2019    Procedure: HIP HEMIARTHROPLASTY LEFT;  Surgeon: Mina Baez MD;  Location: [facility];  Service: Orthopedics       Family History   Family history unknown: Yes       Social History     Socioeconomic History   • Marital status:      Spouse name: Not on file   • Number of children: Not on file   • Years of education: Not on file   • Highest education level: Not on file   Tobacco Use   • Smoking status: Former Smoker     Packs/day: 2.00   • Smokeless tobacco: Never Used   Substance and Sexual Activity   • Alcohol use: No   • Drug use: No   • Sexual activity: Defer   Social History Narrative    Pt lives at [facility] home.  Advanced alz  dementia.  Oriented to self only at baseline          Objective   Physical Exam   Constitutional: He appears well-developed and well-nourished. No distress.   HENT:   Head: Normocephalic and atraumatic.   Nose: Nose normal.   Eyes: Conjunctivae are normal. No scleral icterus.   Neck: Normal range of motion. Neck supple.   Cardiovascular: Normal rate, regular rhythm and normal heart sounds.   No murmur heard.  Pulmonary/Chest: Effort normal and breath sounds normal. No respiratory distress.   Abdominal: Soft. Bowel sounds are normal. There is no tenderness.   Musculoskeletal: Normal range of motion. He exhibits no edema.   Neurological: He is alert.   Confused.    Skin: Skin is warm and dry. There is pallor.   Nursing note and vitals reviewed.      Procedures         ED Course     Anemic, c/w priors since surgery.  Apparently at hip surgery he had abrupt drop in Hb that has not improved despite iron.  FOBT positive, BUN elevated so perhaps GI bleed as well. Patient stable on serial rechecks.  Discussed exam findings, test results so far and concerns in detail at the bedside.  Discussed need for admission for further evaluation and treatment.                  MDM  Number of Diagnoses or Management Options  Symptomatic anemia:      Amount and/or Complexity of Data Reviewed  Clinical lab tests: reviewed and ordered  Decide to obtain previous medical records or to obtain history from someone other than the patient: yes  Review and summarize past medical records: yes  Discuss the patient with other providers: yes        Final diagnoses:   Symptomatic anemia       Documentation assistance provided by efe Carter.  Information recorded by the efe was done at my direction and has been verified and validated by me.     Gerber Carter  03/21/19 1603       Gerber Carter  03/21/19 1725       Quentin Harmon MD  03/21/19 1919

## 2019-03-22 LAB
ANION GAP SERPL CALCULATED.3IONS-SCNC: 9 MMOL/L (ref 3–11)
BASOPHILS # BLD AUTO: 0.03 10*3/MM3 (ref 0–0.2)
BASOPHILS NFR BLD AUTO: 0.5 % (ref 0–1)
BUN BLD-MCNC: 16 MG/DL (ref 9–23)
BUN/CREAT SERPL: 21.9 (ref 7–25)
CALCIUM SPEC-SCNC: 7.9 MG/DL (ref 8.7–10.4)
CHLORIDE SERPL-SCNC: 106 MMOL/L (ref 99–109)
CO2 SERPL-SCNC: 24 MMOL/L (ref 20–31)
CREAT BLD-MCNC: 0.73 MG/DL (ref 0.6–1.3)
DEPRECATED RDW RBC AUTO: 61 FL (ref 37–54)
EOSINOPHIL # BLD AUTO: 0.33 10*3/MM3 (ref 0–0.3)
EOSINOPHIL NFR BLD AUTO: 5.3 % (ref 0–3)
ERYTHROCYTE [DISTWIDTH] IN BLOOD BY AUTOMATED COUNT: 17.3 % (ref 11.3–14.5)
GFR SERPL CREATININE-BSD FRML MDRD: 101 ML/MIN/1.73
GLUCOSE BLD-MCNC: 95 MG/DL (ref 70–100)
HCT VFR BLD AUTO: 27.4 % (ref 38.9–50.9)
HGB BLD-MCNC: 8.4 G/DL (ref 13.1–17.5)
IMM GRANULOCYTES # BLD AUTO: 0.02 10*3/MM3 (ref 0–0.05)
IMM GRANULOCYTES NFR BLD AUTO: 0.3 % (ref 0–0.6)
LYMPHOCYTES # BLD AUTO: 1.39 10*3/MM3 (ref 0.6–4.8)
LYMPHOCYTES NFR BLD AUTO: 22.5 % (ref 24–44)
MCH RBC QN AUTO: 29.8 PG (ref 27–31)
MCHC RBC AUTO-ENTMCNC: 30.7 G/DL (ref 32–36)
MCV RBC AUTO: 97.2 FL (ref 80–99)
MONOCYTES # BLD AUTO: 0.4 10*3/MM3 (ref 0–1)
MONOCYTES NFR BLD AUTO: 6.5 % (ref 0–12)
NEUTROPHILS # BLD AUTO: 4.01 10*3/MM3 (ref 1.5–8.3)
NEUTROPHILS NFR BLD AUTO: 64.9 % (ref 41–71)
PLATELET # BLD AUTO: 258 10*3/MM3 (ref 150–450)
PMV BLD AUTO: 9.2 FL (ref 6–12)
POTASSIUM BLD-SCNC: 3.8 MMOL/L (ref 3.5–5.5)
RBC # BLD AUTO: 2.82 10*6/MM3 (ref 4.2–5.76)
SODIUM BLD-SCNC: 139 MMOL/L (ref 132–146)
VIT B12 BLD-MCNC: 241 PG/ML (ref 211–911)
WBC NRBC COR # BLD: 6.18 10*3/MM3 (ref 3.5–10.8)

## 2019-03-22 PROCEDURE — 82607 VITAMIN B-12: CPT | Performed by: PHYSICIAN ASSISTANT

## 2019-03-22 PROCEDURE — 96376 TX/PRO/DX INJ SAME DRUG ADON: CPT

## 2019-03-22 PROCEDURE — 99203 OFFICE O/P NEW LOW 30 MIN: CPT | Performed by: PHYSICIAN ASSISTANT

## 2019-03-22 PROCEDURE — 85014 HEMATOCRIT: CPT | Performed by: PHYSICIAN ASSISTANT

## 2019-03-22 PROCEDURE — 97166 OT EVAL MOD COMPLEX 45 MIN: CPT

## 2019-03-22 PROCEDURE — 85018 HEMOGLOBIN: CPT | Performed by: PHYSICIAN ASSISTANT

## 2019-03-22 PROCEDURE — 25010000002 NA FERRIC GLUC CPLX PER 12.5 MG: Performed by: PHYSICIAN ASSISTANT

## 2019-03-22 PROCEDURE — 80048 BASIC METABOLIC PNL TOTAL CA: CPT | Performed by: NURSE PRACTITIONER

## 2019-03-22 PROCEDURE — 99226 PR SBSQ OBSERVATION CARE/DAY 35 MINUTES: CPT | Performed by: FAMILY MEDICINE

## 2019-03-22 PROCEDURE — 97162 PT EVAL MOD COMPLEX 30 MIN: CPT

## 2019-03-22 PROCEDURE — 85025 COMPLETE CBC W/AUTO DIFF WBC: CPT | Performed by: NURSE PRACTITIONER

## 2019-03-22 PROCEDURE — G0378 HOSPITAL OBSERVATION PER HR: HCPCS

## 2019-03-22 RX ADMIN — DONEPEZIL HYDROCHLORIDE 10 MG: 10 TABLET, FILM COATED ORAL at 20:56

## 2019-03-22 RX ADMIN — SODIUM CHLORIDE, PRESERVATIVE FREE 3 ML: 5 INJECTION INTRAVENOUS at 20:56

## 2019-03-22 RX ADMIN — LORAZEPAM 0.5 MG: 0.5 TABLET ORAL at 20:56

## 2019-03-22 RX ADMIN — SODIUM CHLORIDE 125 MG: 9 INJECTION, SOLUTION INTRAVENOUS at 10:54

## 2019-03-22 RX ADMIN — SERTRALINE HYDROCHLORIDE 25 MG: 50 TABLET ORAL at 08:44

## 2019-03-22 RX ADMIN — PANTOPRAZOLE SODIUM 40 MG: 40 INJECTION, POWDER, FOR SOLUTION INTRAVENOUS at 08:44

## 2019-03-22 RX ADMIN — PANTOPRAZOLE SODIUM 40 MG: 40 INJECTION, POWDER, FOR SOLUTION INTRAVENOUS at 20:56

## 2019-03-22 NOTE — PLAN OF CARE
Problem: Patient Care Overview  Goal: Plan of Care Review  Outcome: Ongoing (interventions implemented as appropriate)   03/22/19 0828   Coping/Psychosocial   Plan of Care Reviewed With patient   Plan of Care Review   Progress (Evaluation)   OTHER   Outcome Summary OT eval complete. Pt is oriented to self and follow ~ 10% direction. Pt agitated and grabbing at therapist with mobiltiy. Pt dependent x2 supine to sit, max A sitting balance with post lean, dependent sit to supine. OT will attempt to follow 3 x wk and see if pt is able to increase level of participation. Recommend return to ECF upon d/c.

## 2019-03-22 NOTE — THERAPY EVALUATION
Acute Care - Occupational Therapy Initial Evaluation  [facility]     Patient Name: Tee Flores  : 1927  MRN: [id number]  Today's Date: 3/22/2019  Onset of Illness/Injury or Date of Surgery: 19  Date of Referral to OT: 19  Referring Physician: AYE Love    Admit Date: 3/21/2019       ICD-10-CM ICD-9-CM   1. Symptomatic anemia D64.9 285.9   2. Impaired mobility and ADLs Z74.09 799.89     Patient Active Problem List   Diagnosis   • Closed left hip fracture (CMS/HCC)   • Dementia   • Hypertension   • Hyperlipidemia   • CAD (coronary artery disease)   • Symptomatic anemia   • Hypotension     Past Medical History:   Diagnosis Date   • Alzheimer disease    • Atherosclerosis of coronary artery bypass graft    • CAD (coronary artery disease)    • Cerebral infarction (CMS/HCC)    • Dementia    • Hyperlipidemia    • Hypertension    • Stroke (CMS/[facility])     DATA DEFICIT      Past Surgical History:   Procedure Laterality Date   • CORONARY ARTERY BYPASS GRAFT     • HIP HEMIARTHROPLASTY Left 2019    Procedure: HIP HEMIARTHROPLASTY LEFT;  Surgeon: Mina Baez MD;  Location: [facility];  Service: Orthopedics          OT ASSESSMENT FLOWSHEET (last 72 hours)      Occupational Therapy Evaluation     Row Name 19 0825                   OT Evaluation Time/Intention    Subjective Information  no complaints  -AC        Document Type  evaluation  -AC        Mode of Treatment  occupational therapy  -AC        Patient Effort  poor  -AC        Symptoms Noted During/After Treatment  other (see comments)  (Significant)  pt agitated with mobility and began grabbing at therapist  -AC           General Information    Patient Profile Reviewed?  yes  -AC        Onset of Illness/Injury or Date of Surgery  19  -AC        Referring Physician  AYE Love  -AC        Patient Observations  alert;poorly cooperative  -AC        Patient/Family Observations  No family present  -AC         General Observations of Patient  Pt received in bed  -AC        Prior Level of Function  -- unable to determine  -AC        Equipment Currently Used at Home  -- unable to determine  -AC        Pertinent History of Current Functional Problem  Pt admit with anemia.  Pt s/p L hip hemiarthroplasty 02/20.    -AC        Existing Precautions/Restrictions  fall;left;hip L hip hemiarthroplasty 02/20  -AC        Limitations/Impairments  safety/cognitive  -AC        Risks Reviewed  patient:;LOB;increased discomfort  -AC        Benefits Reviewed  patient:;improve function;increase independence;increase strength;increase balance;increase knowledge  -AC        Barriers to Rehab  cognitive status;previous functional deficit  -AC           Relationship/Environment    Primary Source of Support/Comfort  child(gretel)  -AC        Lives With  -- facility resident  -AC        Concerns About Impact on Relationships  prior note states pt lived in memory care unit  -AC           Resource/Environmental Concerns    Current Living Arrangements  residential [facility]  -           Cognitive Assessment/Interventions    Additional Documentation  Cognitive Assessment/Intervention (Group)  -AC           Cognitive Assessment/Intervention- PT/OT    Affect/Mental Status (Cognitive)  agitated with mobility  -AC        Behavioral Issues (Cognitive)  combative/physical outbursts  (Significant)  grabbing at therapist with mobility  -AC        Orientation Status (Cognition)  oriented to;person stated lasted name  -AC        Follows Commands (Cognition)  follows one step commands;0-24% accuracy;increased processing time needed;initiation impaired;physical/tactile prompts required;verbal cues/prompting required  -AC        Safety Deficit (Cognitive)  severe deficit;at risk behavior observed;ability to follow commands;awareness of need for assistance;insight into deficits/self awareness;judgment;problem solving;safety precautions  awareness;safety precautions follow-through/compliance  -        Personal Safety Interventions  fall prevention program maintained  -           Safety Issues, Functional Mobility    Safety Issues Affecting Function (Mobility)  at risk behavior observed;ability to follow commands;insight into deficits/self awareness;judgment;problem solving;safety precaution awareness;safety precautions follow-through/compliance  -AC           Mobility Assessment/Treatment    Extremity Weight-bearing Status  left lower extremity  -AC        Left Lower Extremity (Weight-bearing Status)  weight-bearing as tolerated (WBAT)  -           Bed Mobility Assessment/Treatment    Bed Mobility Assessment/Treatment  rolling left;rolling right;supine-sit-supine  -AC        Rolling Left [place] (Bed Mobility)  dependent (less than 25% patient effort)  -AC        Rolling Right [place] (Bed Mobility)  dependent (less than 25% patient effort)  -AC        Supine-Sit-Supine [place] (Bed Mobility)  dependent (less than 25% patient effort)  -        Bed Mobility, Safety Issues  cognitive deficits limit understanding;decreased use of arms for pushing/pulling;decreased use of legs for bridging/pushing  -AC        Assistive Device (Bed Mobility)  draw sheet;head of bed elevated  -AC        Comment (Bed Mobility)  pt initially assisted with bringing legs to EOB, but then required full assist to bring trunk to midline and began grabbing at therapsist.  Pt sat ~ 1 min and was agiatated, returned to supine.    -AC           Functional Mobility    Functional Mobility- Ind. Level  not appropriate to assess  -AC           Transfer Assessment/Treatment    Comment (Transfers)  not safe to attempt as pt agitated , combative, and confused .  required max A sitting balance EOB  -AC           BADL Safety/Performance    Impairments, BADL Safety/Performance  balance;cognition;strength;trunk/postural control;pain  -AC           General ROM    GENERAL  ROM COMMENTS  BUE PROM WFL,  appears that AROM is WFL,, pt not following direction fully to assess  -AC           MMT (Manual Muscle Testing)    General MMT Comments  unable to assess, pt demo good strength when pushing against therapist with mobiltiy  -AC           Sensory Assessment/Intervention    Sensory General Assessment  -- unable to determine  -AC           Positioning and Restraints    Pre-Treatment Position  in bed  -AC        Post Treatment Position  bed  -AC        In Bed  supine;call light within reach;encouraged to call for assist;exit alarm on;with nsg  -AC           Pain Assessment    Additional Documentation  Pain Scale: FACES Pre/Post-Treatment (Group)  -AC           Pain Scale: Numbers Pre/Post-Treatment    Pain Location - Side  Left  -AC        Pain Location - Orientation  lower  -AC        Pain Location  extremity  -AC        Pain Intervention(s)  Repositioned  -AC           Pain Scale: FACES Pre/Post-Treatment    Pain: FACES Scale, Pretreatment  0-->no hurt  -AC        Pain: FACES Scale, Post-Treatment  6-->hurts even more with movement  -AC           Wound 03/21/19 2100 Right heel pressure injury    Wound - Properties Group Date first assessed: 03/21/19  -LM Time first assessed: 2100  -LM Present On Admission : yes  -LM Side: Right  -LM Location: heel  -LM Type: pressure injury  -LM Stage, Pressure Injury: Stage 2  -LM       Wound 03/21/19 2100 Right anterior;lateral foot unspecified    Wound - Properties Group Date first assessed: 03/21/19  -LM Time first assessed: 2100  -LM Present On Admission : yes  -LM Side: Right  -LM Orientation: anterior;lateral  -LM Location: foot  -LM Type: unspecified  -LM Stage, Pressure Injury: Stage 2  -LM       Wound 03/21/19 2100 Bilateral groin MASD (moisture associated skin damage)    Wound - Properties Group Date first assessed: 03/21/19  -LM Time first assessed: 2100  -LM Present On Admission : yes  -LM Side: Bilateral  -LM Location: groin  -LM Type: MASD  (moisture associated skin damage)  -LM Stage, Pressure Injury: Stage 1  -LM       Wound 03/21/19 2100 Bilateral coccyx MASD (moisture associated skin damage)    Wound - Properties Group Date first assessed: 03/21/19  -LM Time first assessed: 2100  -LM Present On Admission : yes  -LM Side: Bilateral  -LM Location: coccyx  -LM Type: MASD (moisture associated skin damage)  -LM Stage, Pressure Injury: Stage 1  -LM       Clinical Impression (OT)    Date of Referral to OT  03/21/19  -AC        OT Diagnosis  ADL decline  -AC        Patient/Family Goals Statement (OT Eval)  Pt did not state  -AC        Criteria for Skilled Therapeutic Interventions Met (OT Eval)  yes;treatment indicated  -AC        Rehab Potential (OT Eval)  fair, will monitor progress closely  -AC        Therapy Frequency (OT Eval)  3 times/wk  -AC        Care Plan Review (OT)  evaluation/treatment results reviewed  -AC        Anticipated Discharge Disposition (OT)  skilled nursing facility  -AC           Vital Signs    Intra Systolic BP Rehab  134  -AC        Intra Treatment Diastolic BP  71  -AC           Planned OT Interventions    Planned Therapy Interventions (OT Eval)  BADL retraining;functional balance retraining;strengthening exercise;transfer/mobility retraining;occupation/activity based interventions  -AC           OT Goals    Bed Mobility Goal Selection (OT)  bed mobility, OT goal 1  -AC        Transfer Goal Selection (OT)  transfer, OT goal 1  -AC        Grooming Goal Selection (OT)  grooming, OT goal 1  -AC        Strength Goal Selection (OT)  --  -AC        Balance Goal Selection (OT)  balance, OT goal 1  -AC        Additional Documentation  Strength Goal Selection (OT) (Row);Grooming Goal Selection (OT) (Row);Balance Goal Selection (OT) (Row)  -AC           Bed Mobility Goal 1 (OT)    Activity/Assistive Device (Bed Mobility Goal 1, OT)  sit to supine;supine to sit  -AC        [place] Level/Cues Needed (Bed Mobility Goal 1, OT)  verbal  cues required;2 person assist;moderate assist (50-74% patient effort)  -AC        Time Frame (Bed Mobility Goal 1, OT)  by discharge  -AC        Progress/Outcomes (Bed Mobility Goal 1, OT)  goal ongoing  -AC           Transfer Goal 1 (OT)    Activity/Assistive Device (Transfer Goal 1, OT)  bed-to-chair/chair-to-bed;walker, rolling  -AC        [place] Level/Cues Needed (Transfer Goal 1, OT)  maximum assist (25-49% patient effort)  -AC        Time Frame (Transfer Goal 1, OT)  by discharge  -AC        Progress/Outcome (Transfer Goal 1, OT)  goal ongoing  -AC           Grooming Goal 1 (OT)    Activity/Device (Grooming Goal 1, OT)  wash face, hands;hair care  -AC        [place] (Grooming Goal 1, OT)  minimum assist (75% or more patient effort)  -AC        Time Frame (Grooming Goal 1, OT)  by discharge  -AC        Progress/Outcome (Grooming Goal 1, OT)  goal ongoing  -AC           Balance Goal 1 (OT)    Activity/Assistive Device (Balance Goal 1, OT)  sitting, static  -AC        [place] Level/Cues Needed (Balance Goal 1, OT)  minimum assist (75% or more patient effort)  -AC        Time Frame (Balance Goal 1, OT)  by discharge  -AC        Progress/Outcomes (Balance Goal 1, OT)  goal ongoing  -AC           Living Environment    Home Accessibility  wheelchair accessible  -AC          User Key  (r) = Recorded By, (t) = Taken By, (c) = Cosigned By    Initials Name Effective Dates    AC Denise Flor, OT 06/23/15 -     Eliana Mcdowell RN 05/12/17 -                OT Recommendation and Plan  Outcome Summary/Treatment Plan (OT)  Anticipated Discharge Disposition (OT): skilled nursing facility  Planned Therapy Interventions (OT Eval): BADL retraining, functional balance retraining, strengthening exercise, transfer/mobility retraining, occupation/activity based interventions  Therapy Frequency (OT Eval): 3 times/wk  Plan of Care Review  Plan of Care Reviewed With: patient  Plan of Care Reviewed With:  patient  Outcome Summary: OT eval complete. Pt is oriented to self and follow ~ 10% direction.  Pt  agitated and grabbing at therapist with mobiltiy.  Pt dependent x2 supine to sit,  max A sitting balance with post lean, dependent sit to supine.  OT will attempt to follow 3 x wk and see if pt is able to increase level of participation. [facility].     Outcome Measures     Row Name 03/22/19 0825             How much help from another is currently needed...    Putting on and taking off regular lower body clothing?  1  -AC      Bathing (including washing, rinsing, and drying)  1  -AC      Toileting (which includes using toilet bed pan or urinal)  1  -AC      Putting on and taking off regular upper body clothing  1  -AC      Taking care of personal grooming (such as brushing teeth)  1  -AC      Eating meals  2  -AC      Score  7  -AC         Functional Assessment    Outcome Measure Options  AM-PAC 6 Clicks Daily Activity (OT)  -        User Key  (r) = Recorded By, (t) = Taken By, (c) = Cosigned By    Initials Name Provider Type    Denise Banks, OT Occupational Therapist          Time Calculation:   Time Calculation- OT     Row Name 03/22/19 0825             Time Calculation- OT    OT Start Time  0825  -      OT Received On  03/22/19  -      OT Goal Re-Cert Due Date  04/01/19  -        User Key  (r) = Recorded By, (t) = Taken By, (c) = Cosigned By    Initials Name Provider Type    Denise Banks, OT Occupational Therapist        Therapy Charges for Today     Code Description Service Date Service Provider Modifiers Qty    [id number]  OT EVAL MOD COMPLEXITY 4 3/22/2019 Denise Flor, OT GO 1    [id number]  OT THER SUPP EA 15 MIN 3/22/2019 Denise Flor OT GO 2               Denise Flor OT  3/22/2019

## 2019-03-22 NOTE — PLAN OF CARE
Problem: Patient Care Overview  Goal: Plan of Care Review  Outcome: Ongoing (interventions implemented as appropriate)   03/22/19 3999   Coping/Psychosocial   Plan of Care Reviewed With patient   OTHER   Outcome Summary PT initial eval completed. Pt exhibits decreased active and passive ROM of LEs and muscle weakness. Pt limited by decreased command following. PT will attempt to follow 3x per week based on pt participation. Recommend SNF at d/c

## 2019-03-22 NOTE — CONSULTS
[facility] Gastroenterology Consult    Referring Provider: Maxine Ferrell MD   PCP: Deniz Strong MD    Reason for Consultation: Anemia     Chief complaint: Anemia     History of present illness:    Tee Flores is a 89 y.o. male who is admitted with normocytic anemia.   He has advanced dementia and is unable to provide any history.  No family at bedside at this time and history is thus obtained by chart review.  He is s/p recent left hip hemiarthroplasty on 19 and was discharged to rehabilitation with a Hgb of 7.6 on 19.  He was sent to the ED for outpatient labs revealing worsening anemia; H&H found to be relatively unchanged 7.2 and 25.1.   He recently completed Lovenox for VTE prophylaxis.   FOBT was positive in the ED.   No melena nor hematochezia since admission per primary RN.      Allergies:  Patient has no known allergies.    Scheduled Meds:    donepezil 10 mg Oral Nightly   pantoprazole 40 mg Intravenous Q12H   sertraline 25 mg Oral Daily   sodium chloride 3 mL Intravenous Q12H        Infusions:       PRN Meds:  •  acetaminophen  •  LORazepam  •  [COMPLETED] Insert peripheral IV **AND** sodium chloride  •  sodium chloride    Home Meds:  Medications Prior to Admission   Medication Sig Dispense Refill Last Dose   • amLODIPine (NORVASC) 5 MG tablet Take 5 mg by mouth Daily.   3/21/2019 at Unknown time   • aspirin 81 MG EC tablet Take 81 mg by mouth Daily.   3/21/2019 at Unknown time   • donepezil (ARICEPT) 10 MG tablet Take 10 mg by mouth Every Night.   3/20/2019 at Unknown time   • [] enoxaparin (LOVENOX) 30 MG/0.3ML solution syringe Inject 0.3 mL under the skin into the appropriate area as directed Every 12 (Twelve) Hours for 19 days. 8.4 mL  3/21/2019 at Unknown time   • ferrous sulfate 325 (65 FE) MG tablet Take 325 mg by mouth Daily With Breakfast.   3/21/2019 at Unknown time   • LORazepam (ATIVAN) 0.5 MG tablet Take 1 tablet by mouth At Night As Needed for Anxiety. 3 tablet 0  3/21/2019 at Unknown time   • losartan (COZAAR) 50 MG tablet Take 1 tablet by mouth Daily.   3/21/2019 at Unknown time   • metoprolol tartrate (LOPRESSOR) 50 MG tablet Take 50 mg by mouth Daily.   3/21/2019 at Unknown time   • sertraline (ZOLOFT) 25 MG tablet Take 25 mg by mouth Daily.   3/21/2019 at Unknown time   • simvastatin (ZOCOR) 20 MG tablet Take 20 mg by mouth Every Morning.   3/21/2019 at Unknown time   • vitamin D (ERGOCALCIFEROL) 40313 units capsule capsule Take 50,000 Units by mouth 1 (One) Time Per Week. On Thursdays   3/21/2019 at Unknown time   • acetaminophen (TYLENOL) 325 MG tablet Take 650 mg by mouth Every 4 (Four) Hours As Needed for Mild Pain .   Unknown at Unknown time   • bismuth subsalicylate (PEPTO BISMOL) 262 MG/15ML suspension Take 30 mL by mouth Every 4 (Four) Hours As Needed for Indigestion.   Unknown at Unknown time   • docusate sodium 100 MG capsule Take 100 mg by mouth Daily As Needed for Constipation.   Unknown at Unknown time   • magnesium hydroxide (MILK OF MAGNESIA) 400 MG/5ML suspension Take 30 mL by mouth Every 12 (Twelve) Hours As Needed for Constipation.   Unknown at Unknown time       ROS: Review of Systems   Unable to perform ROS: Dementia       PAST MED HX:  Past Medical History:   Diagnosis Date   • Alzheimer disease    • Atherosclerosis of coronary artery bypass graft    • CAD (coronary artery disease)    • Cerebral infarction (CMS/HCC)    • Dementia    • Hyperlipidemia    • Hypertension    • Stroke (CMS/HCC)     DATA DEFICIT        PAST SURG HX:  Past Surgical History:   Procedure Laterality Date   • CORONARY ARTERY BYPASS GRAFT     • HIP HEMIARTHROPLASTY Left 2/20/2019    Procedure: HIP HEMIARTHROPLASTY LEFT;  Surgeon: Mina Baez MD;  Location: [facility];  Service: Orthopedics       FAM HX:  Family History   Family history unknown: Yes       SOC HX:  Social History     Socioeconomic History   • Marital status:      Spouse name: Not on file   • Number  "of children: Not on file   • Years of education: Not on file   • Highest education level: Not on file   Tobacco Use   • Smoking status: Former Smoker     Packs/day: 2.00   • Smokeless tobacco: Never Used   Substance and Sexual Activity   • Alcohol use: No   • Drug use: No   • Sexual activity: Defer   Social History Narrative    Pt lives at [facility] home.  Advanced alz dementia.  Oriented to self only at baseline        PHYSICAL EXAM  /71 (BP Location: Right arm, Patient Position: Lying)   Pulse 86   Temp 97.8 °F (36.6 °C) (Axillary)   Resp 18   Ht 172.7 cm (68\")   Wt 76.4 kg (168 lb 8 oz)   SpO2 96%   BMI 25.62 kg/m²   Wt Readings from Last 3 Encounters:   03/22/19 76.4 kg (168 lb 8 oz)   02/20/19 79.4 kg (175 lb)   02/09/19 78.6 kg (173 lb 3.2 oz)   ,body mass index is 25.62 kg/m².  Physical Exam   Constitutional: No distress.   Elderly, frail, lethargic    HENT:   Head: Normocephalic and atraumatic.   Eyes: No scleral icterus.   Neck: Neck supple.   Cardiovascular: Normal rate and regular rhythm.   Pulmonary/Chest: Effort normal. No respiratory distress.   Abdominal: Soft. Bowel sounds are normal. He exhibits no distension. There is no tenderness.   Musculoskeletal: He exhibits no edema.   Neurological:   Not oriented to place nor time.   Does not follow simple commands.   Falls asleep easily on interview.   Opens eyes to sternal rub.    Skin: There is pallor.   Psychiatric:   Advanced dementia    Nursing note and vitals reviewed.      Results Review:   I reviewed the patient's new clinical results.    Lab Results   Component Value Date    WBC 6.18 03/22/2019    HGB 8.4 (L) 03/22/2019    HGB 8.3 (L) 03/21/2019    HGB 7.2 (L) 03/21/2019    HCT 27.4 (L) 03/22/2019    MCV 97.2 03/22/2019     03/22/2019       Lab Results   Component Value Date    INR 1.12 03/21/2019    INR 1.07 02/20/2019       Lab Results   Component Value Date    GLUCOSE 95 03/22/2019    BUN 16 03/22/2019    " CREATININE 0.73 03/22/2019    EGFRIFNONA 101 03/22/2019    EGFRIFAFRI 72 08/10/2018    BCR 21.9 03/22/2019    CO2 24.0 03/22/2019    CALCIUM 7.9 (L) 03/22/2019    ALBUMIN 4.46 02/20/2019    ALKPHOS 91 02/20/2019    BILITOT 1.1 02/20/2019    ALT 17 02/20/2019    AST 22 02/20/2019     Iron 29.  Iron saturation 10  TIBC 292    ASSESSMENTS/PLANS    1. Iron deficiency anemia  2. Heme positive stools     Appropriate rise in H&H following 1 unit PRBC transfusion.   No melena since admission per primary RN.   BUN decreased overnight.   Recommend conservative management given advanced age, dementia and no overt bleeding.       >>> Continue BID PPI for empiric PUD vs complicated GERD  >>> IV Iron   >>> Repeat H&H tomorrow unless melena occurs     I discussed the patients findings and my recommendations with patient and primary RN     ALFREDO Conley  03/22/19  9:40 AM

## 2019-03-22 NOTE — PROGRESS NOTES
[facility] Medicine Services  PROGRESS NOTE    Patient Name: Tee Flores  : 1927  MRN: [id number]    Date of Admission: 3/21/2019  Length of Stay: 0  Primary Care Physician: Deniz Strong MD    Subjective   Subjective     CC:  anemia    HPI:  Pt laying in bed, difficult to arouse and doesn't answer questions. Just worked with therapy, sat on the edge of the bed but mostly nonverbal, requring max assist.     Review of Systems  Unable to obtain due to AMS    Otherwise ROS is negative except as mentioned in the HPI.    Objective   Objective     Vital Signs:   Temp:  [97.8 °F (36.6 °C)-98.7 °F (37.1 °C)] 97.8 °F (36.6 °C)  Heart Rate:  [69-94] 86  Resp:  [16-18] 18  BP: (101-153)/() 134/71        Physical Exam:  Constitutional: No acute distress, lying in bed comfortably , no family   HENT: NCAT, mucous membranes moist  Respiratory: Clear to auscultation bilaterally, respiratory effort normal   Cardiovascular: RRR, no murmurs, rubs, or gallops, palpable pedal pulses bilaterally  Gastrointestinal: Positive bowel sounds, soft, nontender, nondistended  Musculoskeletal: No bilateral ankle edema  Psychiatric: Appropriate affect, cooperative  Neurologic: Oriented x 3, strength symmetric in all extremities, Cranial Nerves grossly intact to confrontation, speech clear  Skin: No rashes      Results Reviewed:  I have personally reviewed current lab, radiology, and data and agree.    Results from last 7 days   Lab Units 19  0741 19  2215 19  1620 19  1610   WBC 10*3/mm3 6.18  --   --  7.16   HEMOGLOBIN g/dL 8.4* 8.3*  --  7.2*   HEMATOCRIT % 27.4* 28.0*  --  25.1*   PLATELETS 10*3/mm3 258  --   --  308   INR   --   --  1.12  --      Results from last 7 days   Lab Units 19  0741 19  1620   SODIUM mmol/L 139 138   POTASSIUM mmol/L 3.8 4.4   CHLORIDE mmol/L 106 107   CO2 mmol/L 24.0 27.0   BUN mg/dL 16 30*   CREATININE mg/dL 0.73 0.83   GLUCOSE  mg/dL 95 98   CALCIUM mg/dL 7.9* 8.6*     Estimated Creatinine Clearance: 65 mL/min (by C-G formula based on SCr of 0.73 mg/dL).    No results found for: BNP    Microbiology Results Abnormal     None          Imaging Results (last 24 hours)     ** No results found for the last 24 hours. **               I have reviewed the medications:    Current Facility-Administered Medications:   •  acetaminophen (TYLENOL) tablet 650 mg, 650 mg, Oral, Q4H PRN, Essie Garrison APRN  •  donepezil (ARICEPT) tablet 10 mg, 10 mg, Oral, Nightly, Essie Garrison APRN, 10 mg at 03/21/19 2129  •  ferric gluconate (FERRLECIT) 125 mg in sodium chloride 0.9 % 110 mL IVPB, 125 mg, Intravenous, Daily, Maxine Carpio PA, Last Rate: 55 mL/hr at 03/22/19 1054, 125 mg at 03/22/19 1054  •  LORazepam (ATIVAN) tablet 0.5 mg, 0.5 mg, Oral, Nightly PRN, Essie Garrison, APRN, 0.5 mg at 03/21/19 2129  •  pantoprazole (PROTONIX) injection 40 mg, 40 mg, Intravenous, Q12H, Essie Garrison APRN, 40 mg at 03/22/19 0844  •  sertraline (ZOLOFT) tablet 25 mg, 25 mg, Oral, Daily, Essie Garrison APRN, 25 mg at 03/22/19 0844  •  [COMPLETED] Insert peripheral IV, , , Once **AND** sodium chloride 0.9 % flush 10 mL, 10 mL, Intravenous, PRN, Quentin Harmon MD  •  sodium chloride 0.9 % flush 3 mL, 3 mL, Intravenous, Q12H, Essie Garrison APRN, 3 mL at 03/21/19 2130  •  sodium chloride 0.9 % flush 3-10 mL, 3-10 mL, Intravenous, PRN, Essie Garrison APRN      Assessment/Plan   Assessment / Plan     Active Hospital Problems    Diagnosis Date Noted   • **Symptomatic anemia [D64.9] 03/21/2019   • Hyperlipidemia [E78.5] 03/21/2019   • CAD (coronary artery disease) [I25.10] 03/21/2019   • Hypotension [I95.9] 03/21/2019   • Hypertension [I10] 02/20/2019   • Dementia [F03.90] 02/20/2019   • Closed left hip fracture (CMS/[facility]) [S72.002A] 02/20/2019          Brief Hospital Course to date:  Tee  SHERIDAN Flores is a 89 y.o. male admitted 3/21 with normocytic anemia.   He has advanced dementia and is unable to provide any history.  No family at bedside at this time and history is thus obtained by chart review.  He is s/p recent left hip hemiarthroplasty on 2/20/19 and was discharged to rehabilitation with a Hgb of 7.6 on 2/28/19.  He was sent to the ED for outpatient labs revealing worsening anemia; H&H found to be relatively unchanged 7.2 and 25.1.   He recently completed Lovenox for VTE prophylaxis.   FOBT was positive in the ED.   No melena nor hematochezia since admission per primary RN    Iron Def anemia  --IV Iron    Guiac positive stool   --Gi following, wishes to hold on further aggressive workup  --ppi bid   --T&C and s/p transfused 1 unit, prepared 2.    --Add LDH, peripheral smear and reticulocyte site count to blood in lab-however suspect this is just poor rebound from postoperative anemia with a component of melena.  --PPI twice daily  --Clear liquids for now,advance per GI  --Serial H and H q. 8     Recent left hip fracture  S/p left hip hemiarthroplasty 2/20/19  --s/p left hip hemiarthroplasty 2/20/19 Dr. carvajal.  Noted significant blood loss postop likely a mix of intraoperative losses and delusional effect.  Charge back to SNF without having had blood transfusion.  Recommended close observation and monitoring.  --Was on Lovenox for VT E prophylaxis postop through yesterday 3/20/19  --PT/OT consult  --CM consulted for discharge planning (patient lives at [facility])  --No anticoagulant for now due to anemia     Advanced Alzheimer's dementia-questionable history of stroke which may explain his expressive aphasia  --Continue home meds  --Up with assist, turn  --CM for discharge planning     HTN/HLD  CAD  --We will hold metoprolol, Norvasc and losartan for now as he was hypotensive on presentation.  Plan to transfuse and will add back in cardiac meds at earliest opportunity, bp seems  stable  Now possibly restart meds tomorrow or this evening if bp tolerates.      Hx CVA (data deficit)      DVT prophylaxis:    Teds/seqs  No a/c due to GIB/anemia     Disposition: I expect the patient to be discharged tbd    CODE STATUS:   Code Status and Medical Interventions:   Ordered at: 03/21/19 1812     Level Of Support Discussed With:    Patient     Code Status:    CPR     Medical Interventions (Level of Support Prior to Arrest):    Full         Electronically signed by Rosalva Rosado DO, 03/22/19, 1:27 PM.

## 2019-03-22 NOTE — PLAN OF CARE
Problem: Patient Care Overview  Goal: Plan of Care Review  Outcome: Ongoing (interventions implemented as appropriate)   03/22/19 5661   Coping/Psychosocial   Plan of Care Reviewed With patient   Plan of Care Review   Progress no change   OTHER   Outcome Summary Patient consult for wound to right foot and right heel. Right lateral foot with possible healing pressure injury vs. healing abrasion-area dry/scabbed-cleaned with sterile normal saline and swabbed with povidone-iodine-covered with foam dressing. Right heel with unstagable pressure injury-area cleaned with sterile normal saline, applied thera honey to wound bed, covered with xeroform and foam dressing. PT Wound Care consulted for MIST/debridement. Will order Dolphin bed with low air loss topper. Black heel boots in place. See skin and wound care orders. WOC will continue to follow.

## 2019-03-22 NOTE — PROGRESS NOTES
"Discharge Planning Assessment  [facility]     Patient Name: Tee Flores  MRN: [id number]  Today's Date: 3/22/2019    Admit Date: 3/21/2019    Discharge Needs Assessment     Row Name 03/22/19 1626       Living Environment    Lives With  alone    Name(s) of Who Lives With Patient  At [facility]    Current Living Arrangements  independent/assisted living facility    Primary Care Provided by  other (see comments) facility staff    Provides Primary Care For  no one, unable/limited ability to care for self    Family Caregiver if Needed  other (see comments);grandchild(gretel), adult;child(gretel), adult facility staff    Quality of Family Relationships  helpful;involved;supportive    Able to Return to Prior Arrangements  yes    Living Arrangement Comments  SW spoke to CIRILO on [facility] [address] about possible return over the weekend.       Transition Planning    Patient/Family Anticipates Transition to  inpatient rehabilitation facility    Patient/Family Anticipated Services at Transition  skilled nursing    Transportation Anticipated  health plan transportation [facility]       Discharge Needs Assessment    Readmission Within the Last 30 Days  no previous admission in last 30 days    Concerns to be Addressed  discharge planning    Equipment Currently Used at Home  wheelchair;walker, standard    Equipment Needed After Discharge  none    Outpatient/Agency/Support Group Needs  skilled nursing facility    Discharge Facility/Level of Care Needs  nursing facility, skilled    Patient's Choice of Community Agency(s)  [facility]    Current Discharge Risk  physical impairment;dependent with mobility/activities of daily living    Discharge Coordination/Progress  Pt. presented to the hospital from [facility] where pt. was receiving skilled rehab services.  Per pt's family and [facility] RN \"Cirilo\", pt. needs more rehab prior to the family's goal of returning pt. to his home at [place].  Pt. will require [facility] transportation at discharge.      "     Discharge Plan     Row Name 03/22/19 0198       Plan    Plan  return to [facility]    Patient/Family in Agreement with Plan  yes    Plan Comments  JAROD spoke with pt. and several family members who were at bedside.  Plan is for pt. to return to [facility] when medically ready.  In the event pt. is ready for discharge on Saturday, 3/23/19, SW has arranged [facility] for 1600.  If pt's discharge is delayed, weekend CM will need to rearrange [facility] trip.  Trip # [id number].    Final Discharge Disposition Code  03 - skilled nursing facility (SNF)        Destination      No service coordination in this encounter.      Durable Medical Equipment      No service coordination in this encounter.      Dialysis/Infusion      No service coordination in this encounter.      Home Medical Care      No service coordination in this encounter.      Community Resources      No service coordination in this encounter.          Demographic Summary     Row Name 03/22/19 1625       General Information    Admission Type  observation    Referral Source  physician;nursing    Reason for Consult  discharge planning    Preferred Language  English     Used During This Interaction  no    General Information Comments  PCP is Deniz Strong        Functional Status     Row Name 03/22/19 1626       Employment/    Employment/ Comments  Pt. has insurance and prescription coverage through [employer].        Psychosocial    No documentation.       Abuse/Neglect    No documentation.       Legal    No documentation.       Substance Abuse    No documentation.       Patient Forms    No documentation.           VENKATESH Caceres

## 2019-03-22 NOTE — PROGRESS NOTES
Clinical Nutrition   Reason For Visit: Identified at risk by screening criteria, MST score 2+    Patient Name: Tee Flores  YOB: 1927  MRN: [id number]  Date of Encounter: 03/22/19 9:08 AM  Admission date: 3/21/2019      Per nursing home RN ([facility]), patient was receiving pureed textures / thin liquids.    RD will order appropriate oral nutrition supplements once PO diet initiated.      Nutrition Assessment     Admission Problem List:  Symptomatic anemia  GIB  Expressive aphasia  Hypotension  ?Right heel wound      Applicable Nutrition-Related Information:  -Unable to communicate appropriately 2/2 advanced dementia  -Resident at nursing home ([facility])  -Recent [facility] admission (2/20-3/1) for UTI and left hip fracture s/p left ELIOT (2/20). Patient was discharge to [facility] at discharge.        PMH: He  has a past medical history of Alzheimer disease, Atherosclerosis of coronary artery bypass graft, CAD (coronary artery disease), Cerebral infarction (CMS/HCC), Dementia, Hyperlipidemia, Hypertension, and Stroke (CMS/[facility]).   PSxH: He  has a past surgical history that includes Hip hemiArthroplasty (Left, 2/20/2019) and Coronary artery bypass graft.        Reported/Observed/Food/Nutrition Related History   RD notes patient currently NPO and waiting for GI to see.    RD notes during previous admission pt was on dysphagia modified diet.    Called and spoke with RN at nursing home who reports patient has been at the NH since 3/1, no food allergies, receives pureed textures/thin liquids (as well as ProStat and Jim 2/2 right heel wound). Patient generally eats <50% at all meals, and sometimes he chooses to not eat at all due to his dementia. Patient came to [facility] at 174 lbs and weighed 168 lbs as of 3/14 (via lift/sling scale).        Anthropometrics   Height: 68 in  Weight: 168 lbs (bedscale wt 3/22 per [facility] doc)  BMI: 25.6  BMI classification: Overweight:  25.0-29.9kg/m2    UBW: N/A (pt weighed 174 lbs 3/1 per nursing home RN)  IBW: 154 lbs      Weight change: Based on NH weight records, patient unintentionally lost 6 lbs from 3/1 to 3/14 (x 2 weeks), but according to [facility] weight on admission patient has maintained weight of 168 lbs.      Labs reviewed   Labs reviewed: Yes    Medications reviewed   Medications reviewed: Yes    Current Nutrition Prescription   PO: NPO Diet    Evaluation of Received Nutrient/Fluid Intake: insufficient data      Nutrition Diagnosis     Problem Inadequate oral intake   Etiology Clinical condition, current diet order   Signs/Symptoms NPO       Intervention   Intervention: Follow treatment progress, Care plan reviewed, Await begin PO     RD will order appropriate oral nutrition supplements once PO diet initiated      Goal:   General: Nutrition support treatment  PO: Initiate diet as medically appropriate      Monitoring/Evaluation:       Monitoring/Evaluation: Per protocol, Weight, GI status, Swallow function  Will Continue to follow per protocol  Arleen Swanson, RD  Time Spent: 30 min

## 2019-03-23 VITALS
OXYGEN SATURATION: 96 % | SYSTOLIC BLOOD PRESSURE: 130 MMHG | HEART RATE: 100 BPM | HEIGHT: 68 IN | RESPIRATION RATE: 16 BRPM | DIASTOLIC BLOOD PRESSURE: 64 MMHG | WEIGHT: 162.3 LBS | BODY MASS INDEX: 24.6 KG/M2 | TEMPERATURE: 97.7 F

## 2019-03-23 PROBLEM — K27.9 PUD (PEPTIC ULCER DISEASE): Status: ACTIVE | Noted: 2019-03-23

## 2019-03-23 PROBLEM — I95.9 HYPOTENSION: Status: RESOLVED | Noted: 2019-03-21 | Resolved: 2019-03-23

## 2019-03-23 PROBLEM — R19.5 OCCULT BLOOD POSITIVE STOOL: Status: ACTIVE | Noted: 2019-03-23

## 2019-03-23 LAB
ANION GAP SERPL CALCULATED.3IONS-SCNC: 7 MMOL/L (ref 3–11)
BUN BLD-MCNC: 11 MG/DL (ref 9–23)
BUN/CREAT SERPL: 12.9 (ref 7–25)
CALCIUM SPEC-SCNC: 8.4 MG/DL (ref 8.7–10.4)
CHLORIDE SERPL-SCNC: 105 MMOL/L (ref 99–109)
CO2 SERPL-SCNC: 26 MMOL/L (ref 20–31)
CREAT BLD-MCNC: 0.85 MG/DL (ref 0.6–1.3)
CYTOLOGIST CVX/VAG CYTO: NORMAL
DEPRECATED RDW RBC AUTO: 62.9 FL (ref 37–54)
ERYTHROCYTE [DISTWIDTH] IN BLOOD BY AUTOMATED COUNT: 17.4 % (ref 11.3–14.5)
GFR SERPL CREATININE-BSD FRML MDRD: 84 ML/MIN/1.73
GLUCOSE BLD-MCNC: 90 MG/DL (ref 70–100)
HCT VFR BLD AUTO: 27.4 % (ref 38.9–50.9)
HCT VFR BLD AUTO: 29.4 % (ref 38.9–50.9)
HGB BLD-MCNC: 8.2 G/DL (ref 13.1–17.5)
HGB BLD-MCNC: 8.8 G/DL (ref 13.1–17.5)
MCH RBC QN AUTO: 29.7 PG (ref 27–31)
MCHC RBC AUTO-ENTMCNC: 29.9 G/DL (ref 32–36)
MCV RBC AUTO: 99.3 FL (ref 80–99)
PATH INTERP BLD-IMP: NORMAL
PLATELET # BLD AUTO: 283 10*3/MM3 (ref 150–450)
PMV BLD AUTO: 9.8 FL (ref 6–12)
POTASSIUM BLD-SCNC: 3.9 MMOL/L (ref 3.5–5.5)
RBC # BLD AUTO: 2.96 10*6/MM3 (ref 4.2–5.76)
SODIUM BLD-SCNC: 138 MMOL/L (ref 132–146)
WBC NRBC COR # BLD: 6.05 10*3/MM3 (ref 3.5–10.8)

## 2019-03-23 PROCEDURE — 99217 PR OBSERVATION CARE DISCHARGE MANAGEMENT: CPT | Performed by: NURSE PRACTITIONER

## 2019-03-23 PROCEDURE — 96376 TX/PRO/DX INJ SAME DRUG ADON: CPT

## 2019-03-23 PROCEDURE — 25010000002 NA FERRIC GLUC CPLX PER 12.5 MG: Performed by: PHYSICIAN ASSISTANT

## 2019-03-23 PROCEDURE — 80048 BASIC METABOLIC PNL TOTAL CA: CPT | Performed by: FAMILY MEDICINE

## 2019-03-23 PROCEDURE — G0378 HOSPITAL OBSERVATION PER HR: HCPCS

## 2019-03-23 PROCEDURE — 85027 COMPLETE CBC AUTOMATED: CPT | Performed by: FAMILY MEDICINE

## 2019-03-23 RX ORDER — LORAZEPAM 0.5 MG/1
0.5 TABLET ORAL NIGHTLY PRN
Qty: 3 TABLET | Refills: 0 | Status: SHIPPED | OUTPATIENT
Start: 2019-03-23

## 2019-03-23 RX ORDER — FERROUS SULFATE 325(65) MG
325 TABLET ORAL 2 TIMES DAILY WITH MEALS
Start: 2019-03-23

## 2019-03-23 RX ORDER — PANTOPRAZOLE SODIUM 40 MG/1
TABLET, DELAYED RELEASE ORAL
Start: 2019-03-23

## 2019-03-23 RX ADMIN — SODIUM CHLORIDE 125 MG: 9 INJECTION, SOLUTION INTRAVENOUS at 09:00

## 2019-03-23 RX ADMIN — SERTRALINE HYDROCHLORIDE 25 MG: 50 TABLET ORAL at 08:30

## 2019-03-23 RX ADMIN — PANTOPRAZOLE SODIUM 40 MG: 40 INJECTION, POWDER, FOR SOLUTION INTRAVENOUS at 08:30

## 2019-03-23 RX ADMIN — SODIUM CHLORIDE, PRESERVATIVE FREE 3 ML: 5 INJECTION INTRAVENOUS at 08:30

## 2019-03-23 RX ADMIN — SODIUM CHLORIDE 125 MG: 9 INJECTION, SOLUTION INTRAVENOUS at 08:30

## 2019-03-23 NOTE — PLAN OF CARE
Problem: Patient Care Overview  Goal: Plan of Care Review  Outcome: Ongoing (interventions implemented as appropriate)   03/23/19 0631   Coping/Psychosocial   Plan of Care Reviewed With patient   Plan of Care Review   Progress no change   OTHER   Outcome Summary Pt slept well through the night, no c/o pain or nausea. Switched to specialized dolphin bed this am, wound care completed to right foot and right heel at 0600. Wound care supplies at bedside. Maintaining on room air, VSS, SR with PVCs on tele. No further issues at this time. Will continue to monitor.

## 2019-03-23 NOTE — DISCHARGE SUMMARY
[facility] Medicine Services  DISCHARGE SUMMARY    Patient Name: Tee Flores  : 1927  MRN: [id number]    Date of Admission: 3/21/2019  Date of Discharge:  2019  Primary Care Physician: Deniz Strong MD    Consults     Date and Time Order Name Status Description    3/22/2019 0030 Inpatient Gastroenterology Consult Completed           Hospital Course     Presenting Problem:   Symptomatic anemia [D64.9]    Active Hospital Problems    Diagnosis  POA   • **Symptomatic anemia [D64.9]  Unknown   • Occult blood positive stool [R19.5]  Unknown   • PUD (peptic ulcer disease) [K27.9]  Unknown   • Hyperlipidemia [E78.5]  Unknown   • CAD (coronary artery disease) [I25.10]  Unknown   • Hypertension [I10]  Yes   • Dementia [F03.90]  Yes   • Closed left hip fracture (CMS/HCC) [S72.002A]  Yes      Resolved Hospital Problems    Diagnosis Date Resolved POA   • Hypotension [I95.9] 2019 Unknown      Hospital Course:  Tee Flores is a 89 y.o. male was admitted with normocytic anemia.  He is status post recent left hip hemiarthroplasty on 19.  He was discharged to rehabilitation with a hemoglobin of 7.6 on 19.  He was then sent to the emergency room for worsening anemia.  Hemoglobin found to be 7.2.  He recently completed Lovenox for VT E prophylaxis.  He did have a positive fecal occult test in the emergency department.  He did receive 1 unit of blood.  Anemia workup also revealed iron deficiency anemia.  He has received 2 doses of IV iron.  Recommend oral iron twice daily.  Due to his positive guaiac GI was consulted.  He was started on twice daily PPI for presumptive PUD or complicated GERD.  After a month of twice daily therapy he take once daily.  Endoscopy was deferred due to age and clinical status.  His hemoglobin is stabilized.  He was hypotensive on admission and his home blood pressure medications were held.  Continue to hold amlodipine and losartan.  May  resume metoprolol at a much lower dose.  Blood pressure has been stable. Repeat hemoglobin in a few days.  He can go back to [facility] today.    Discharge Follow Up Recommendations for labs/diagnostics:  Repeat hemoglobin in 2 days  PCP in 1 week    Day of Discharge     HPI:   No complaints.  No overnight events.    Review of Systems  He denies any pain or shortness of breath.  Review of systems Limited due to mental status    Vital Signs:   Temp:  [97.7 °F (36.5 °C)-98 °F (36.7 °C)] 97.7 °F (36.5 °C)  Heart Rate:  [] 100  Resp:  [16-18] 16  BP: (126-130)/(61-64) 130/64     Physical Exam:  Gen-no acute distress, resting in bed  CV-RRR, S1 S2 normal, no m/r/g  Resp-CTAB, normal WOB  Abd-soft, NT, ND, +BS  Ext-no edema, PPP, left hip incision healing well  Neuro-completely disoriented but very pleasant, HUITRON  Skin-no overt rashes noted  Psych-appropriate mood    Pertinent  and/or Most Recent Results     Results from last 7 days   Lab Units 03/23/19  0436 03/22/19  2341 03/22/19  0741 03/21/19  2215 03/21/19  1620 03/21/19  1610   WBC 10*3/mm3 6.05  --  6.18  --   --  7.16   HEMOGLOBIN g/dL 8.8* 8.2* 8.4* 8.3*  --  7.2*   HEMATOCRIT % 29.4* 27.4* 27.4* 28.0*  --  25.1*   PLATELETS 10*3/mm3 283  --  258  --   --  308   SODIUM mmol/L 138  --  139  --  138  --    POTASSIUM mmol/L 3.9  --  3.8  --  4.4  --    CHLORIDE mmol/L 105  --  106  --  107  --    CO2 mmol/L 26.0  --  24.0  --  27.0  --    BUN mg/dL 11  --  16  --  30*  --    CREATININE mg/dL 0.85  --  0.73  --  0.83  --    GLUCOSE mg/dL 90  --  95  --  98  --    CALCIUM mg/dL 8.4*  --  7.9*  --  8.6*  --      Results from last 7 days   Lab Units 03/21/19  1620   PROTIME Seconds 13.8   INR  1.12   APTT seconds 27.7                                   Discharge Details        Discharge Medications      New Medications      Instructions Start Date   pantoprazole 40 MG EC tablet  Commonly known as:  PROTONIX   Take 1 tablet by mouth twice daily x 30  days, then once daily thereafter         Changes to Medications      Instructions Start Date   ferrous sulfate 325 (65 FE) MG tablet  What changed:  when to take this   325 mg, Oral, 2 Times Daily With Meals      metoprolol tartrate 25 MG tablet  Commonly known as:  LOPRESSOR  What changed:    · medication strength  · how much to take  · when to take this   12.5 mg, Oral, 2 Times Daily         Continue These Medications      Instructions Start Date   acetaminophen 325 MG tablet  Commonly known as:  TYLENOL   650 mg, Oral, Every 4 Hours PRN      aspirin 81 MG EC tablet   81 mg, Oral, Daily      docusate sodium 100 MG capsule   100 mg, Oral, Daily PRN      donepezil 10 MG tablet  Commonly known as:  ARICEPT   10 mg, Oral, Nightly      LORazepam 0.5 MG tablet  Commonly known as:  ATIVAN   0.5 mg, Oral, Nightly PRN      magnesium hydroxide 400 MG/5ML suspension  Commonly known as:  MILK OF MAGNESIA   30 mL, Oral, Every 12 Hours PRN      sertraline 25 MG tablet  Commonly known as:  ZOLOFT   25 mg, Oral, Daily      simvastatin 20 MG tablet  Commonly known as:  ZOCOR   20 mg, Oral, Every Morning      vitamin D 58692 units capsule capsule  Commonly known as:  ERGOCALCIFEROL   50,000 Units, Oral, Weekly, On Thursdays         Stop These Medications    amLODIPine 5 MG tablet  Commonly known as:  NORVASC     bismuth subsalicylate 262 MG/15ML suspension  Commonly known as:  PEPTO BISMOL     enoxaparin 30 MG/0.3ML solution syringe  Commonly known as:  LOVENOX     losartan 50 MG tablet  Commonly known as:  COZAAR          No Known Allergies    Discharge Disposition:  [facility]    Discharge Diet:  Diet Order   Procedures   • Diet Clear Liquid     Discharge Activity: as tolerated    CODE STATUS:    Code Status and Medical Interventions:   Ordered at: 03/21/19 1812     Level Of Support Discussed With:    Patient     Code Status:    CPR     Medical Interventions (Level of Support Prior to Arrest):    Full     No future  appointments.    Additional Instructions for the Follow-ups that You Need to Schedule     Discharge Follow-up with PCP   As directed       Currently Documented PCP:    Deniz Strong MD    PCP Phone Number:    [phone]     Follow Up Details:  PCP in 1 week             Time Spent on Discharge: 40 minutes    Electronically signed by AYE Mcdowell, 03/23/19, 11:40 AM.

## 2019-03-23 NOTE — DISCHARGE PLACEMENT REQUEST
"LISETH PINEDO, RN    [phone]              Augie Desouza (89 y.o. Male)     Date of Birth Social Security Number Address Home Phone MRN    [id number]  [address]  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 [address]       Subscriber Name Subscriber Birth Date Member ID       AUGIE DESOUZA 1927 [id number]                 Emergency Contacts      (Rel.) Home Phone Work Phone Mobile Phone    Augie Desouza (Son) -- -- [phone]    Elenita Desouza (Grandchild) -- -- [phone]    Sidney Desouza (Son) [phone] -- --               Discharge Summary      Emerald Greene APRN at 3/23/2019 11:40 AM              [facility] Medicine Services  DISCHARGE SUMMARY    Patient Name: Augie Desouza  : 1927  MRN: [id number]    Date of Admission: 3/21/2019  Date of Discharge:  2019  Primary Care Physician: Deniz Strong MD    Consults     Date and Time Order Name " Status Description    3/22/2019 0030 Inpatient Gastroenterology Consult Completed           Hospital Course     Presenting Problem:   Symptomatic anemia [D64.9]    Active Hospital Problems    Diagnosis  POA   • **Symptomatic anemia [D64.9]  Unknown   • Occult blood positive stool [R19.5]  Unknown   • PUD (peptic ulcer disease) [K27.9]  Unknown   • Hyperlipidemia [E78.5]  Unknown   • CAD (coronary artery disease) [I25.10]  Unknown   • Hypertension [I10]  Yes   • Dementia [F03.90]  Yes   • Closed left hip fracture (CMS/HCC) [S72.002A]  Yes      Resolved Hospital Problems    Diagnosis Date Resolved POA   • Hypotension [I95.9] 03/23/2019 Unknown      Hospital Course:  Tee Flores is a 89 y.o. male was admitted with normocytic anemia.  He is status post recent left hip hemiarthroplasty on 2/20/19.  He was discharged to rehabilitation with a hemoglobin of 7.6 on 2/28/19.  He was then sent to the emergency room for worsening anemia.  Hemoglobin found to be 7.2.  He recently completed Lovenox for VT E prophylaxis.  He did have a positive fecal occult test in the emergency department.  He did receive 1 unit of blood.  Anemia workup also revealed iron deficiency anemia.  He has received 2 doses of IV iron.  Recommend oral iron twice daily.  Due to his positive guaiac GI was consulted.  He was started on twice daily PPI for presumptive PUD or complicated GERD.  After a month of twice daily therapy he take once daily.  Endoscopy was deferred due to age and clinical status.  His hemoglobin is stabilized.  He was hypotensive on admission and his home blood pressure medications were held.  Continue to hold amlodipine and losartan.  May resume metoprolol at a much lower dose.  Blood pressure has been stable. Repeat hemoglobin in a few days.  He can go back to [facility] today.    Discharge Follow Up Recommendations for labs/diagnostics:  Repeat hemoglobin in 2 days  PCP in 1 week    Day of Discharge     HPI:   No  complaints.  No overnight events.    Review of Systems  He denies any pain or shortness of breath.  Review of systems Limited due to mental status    Vital Signs:   Temp:  [97.7 °F (36.5 °C)-98 °F (36.7 °C)] 97.7 °F (36.5 °C)  Heart Rate:  [] 100  Resp:  [16-18] 16  BP: (126-130)/(61-64) 130/64     Physical Exam:  Gen-no acute distress, resting in bed  CV-RRR, S1 S2 normal, no m/r/g  Resp-CTAB, normal WOB  Abd-soft, NT, ND, +BS  Ext-no edema, PPP, left hip incision healing well  Neuro-completely disoriented but very pleasant, HUITRON  Skin-no overt rashes noted  Psych-appropriate mood    Pertinent  and/or Most Recent Results     Results from last 7 days   Lab Units 03/23/19  0436 03/22/19  2341 03/22/19  0741 03/21/19  2215 03/21/19  1620 03/21/19  1610   WBC 10*3/mm3 6.05  --  6.18  --   --  7.16   HEMOGLOBIN g/dL 8.8* 8.2* 8.4* 8.3*  --  7.2*   HEMATOCRIT % 29.4* 27.4* 27.4* 28.0*  --  25.1*   PLATELETS 10*3/mm3 283  --  258  --   --  308   SODIUM mmol/L 138  --  139  --  138  --    POTASSIUM mmol/L 3.9  --  3.8  --  4.4  --    CHLORIDE mmol/L 105  --  106  --  107  --    CO2 mmol/L 26.0  --  24.0  --  27.0  --    BUN mg/dL 11  --  16  --  30*  --    CREATININE mg/dL 0.85  --  0.73  --  0.83  --    GLUCOSE mg/dL 90  --  95  --  98  --    CALCIUM mg/dL 8.4*  --  7.9*  --  8.6*  --      Results from last 7 days   Lab Units 03/21/19  1620   PROTIME Seconds 13.8   INR  1.12   APTT seconds 27.7                                   Discharge Details        Discharge Medications      New Medications      Instructions Start Date   pantoprazole 40 MG EC tablet  Commonly known as:  PROTONIX   Take 1 tablet by mouth twice daily x 30 days, then once daily thereafter         Changes to Medications      Instructions Start Date   ferrous sulfate 325 (65 FE) MG tablet  What changed:  when to take this   325 mg, Oral, 2 Times Daily With Meals      metoprolol tartrate 25 MG tablet  Commonly known as:  LOPRESSOR  What changed:     · medication strength  · how much to take  · when to take this   12.5 mg, Oral, 2 Times Daily         Continue These Medications      Instructions Start Date   acetaminophen 325 MG tablet  Commonly known as:  TYLENOL   650 mg, Oral, Every 4 Hours PRN      aspirin 81 MG EC tablet   81 mg, Oral, Daily      docusate sodium 100 MG capsule   100 mg, Oral, Daily PRN      donepezil 10 MG tablet  Commonly known as:  ARICEPT   10 mg, Oral, Nightly      LORazepam 0.5 MG tablet  Commonly known as:  ATIVAN   0.5 mg, Oral, Nightly PRN      magnesium hydroxide 400 MG/5ML suspension  Commonly known as:  MILK OF MAGNESIA   30 mL, Oral, Every 12 Hours PRN      sertraline 25 MG tablet  Commonly known as:  ZOLOFT   25 mg, Oral, Daily      simvastatin 20 MG tablet  Commonly known as:  ZOCOR   20 mg, Oral, Every Morning      vitamin D 55299 units capsule capsule  Commonly known as:  ERGOCALCIFEROL   50,000 Units, Oral, Weekly, On Thursdays         Stop These Medications    amLODIPine 5 MG tablet  Commonly known as:  NORVASC     bismuth subsalicylate 262 MG/15ML suspension  Commonly known as:  PEPTO BISMOL     enoxaparin 30 MG/0.3ML solution syringe  Commonly known as:  LOVENOX     losartan 50 MG tablet  Commonly known as:  COZAAR          No Known Allergies    Discharge Disposition:  [facility]    Discharge Diet:  Diet Order   Procedures   • Diet Clear Liquid     Discharge Activity: as tolerated    CODE STATUS:    Code Status and Medical Interventions:   Ordered at: 03/21/19 1812     Level Of Support Discussed With:    Patient     Code Status:    CPR     Medical Interventions (Level of Support Prior to Arrest):    Full     No future appointments.    Additional Instructions for the Follow-ups that You Need to Schedule     Discharge Follow-up with PCP   As directed       Currently Documented PCP:    Deniz Strong MD    PCP Phone Number:    [phone]     Follow Up Details:  PCP in 1 week             Time Spent on  Discharge: 40 minutes    Electronically signed by AYE Mcdowell, 03/23/19, 11:40 AM.      Electronically signed by Emerald Greene APRN at 3/23/2019 11:47 AM

## 2019-03-23 NOTE — NURSING NOTE
3/23/2019 received a call from [facility] brown wanting to know if I was calling report before pt. Comes back and I told them yes. I was not in the position at that time to give report. I called back to give report and the  at [facility] asked me what time pt. Would be there I satated was suppose to have ambulance  at 1600 and hung up.  JEAN MARIE Burton RN.

## 2019-03-25 LAB
ABO + RH BLD: NORMAL
ABO + RH BLD: NORMAL
BH BB BLOOD EXPIRATION DATE: NORMAL
BH BB BLOOD EXPIRATION DATE: NORMAL
BH BB BLOOD TYPE BARCODE: 5100
BH BB BLOOD TYPE BARCODE: 5100
BH BB DISPENSE STATUS: NORMAL
BH BB DISPENSE STATUS: NORMAL
BH BB PRODUCT CODE: NORMAL
BH BB PRODUCT CODE: NORMAL
BH BB UNIT NUMBER: NORMAL
BH BB UNIT NUMBER: NORMAL
UNIT  ABO: NORMAL
UNIT  ABO: NORMAL
UNIT  RH: NORMAL
UNIT  RH: NORMAL

## 2019-03-25 NOTE — THERAPY DISCHARGE NOTE
Acute Care - Occupational Therapy Discharge Summary  [facility]     Patient Name: Tee Flores  : 1927  MRN: [id number]    Today's Date: 3/25/2019  Onset of Illness/Injury or Date of Surgery: 19    Date of Referral to OT: 19  Referring Physician: AYE Garrison      Admit Date: 3/21/2019        OT Recommendation and Plan    Visit Dx:    ICD-10-CM ICD-9-CM   1. Symptomatic anemia D64.9 285.9   2. Impaired mobility and ADLs Z74.09 799.89   3. Impaired functional mobility, balance, gait, and endurance Z74.09 V49.89               Rehab Goal Summary     Row Name 19 1416             Bed Mobility Goal 1 (OT)    Progress/Outcomes (Bed Mobility Goal 1, OT)  goal not met;discharged from facility  -DAVIDE         Transfer Goal 1 (OT)    Progress/Outcome (Transfer Goal 1, OT)  goal not met;discharged from facility  -DAVIDE         Grooming Goal 1 (OT)    Progress/Outcome (Grooming Goal 1, OT)  goal not met;discharged from facility  -DAVIDE         Balance Goal 1 (OT)    Progress/Outcomes (Balance Goal 1, OT)  goal not met;discharged from facility  -DAVIDE        User Key  (r) = Recorded By, (t) = Taken By, (c) = Cosigned By    Initials Name Provider Type Discipline    Diamante Dean, OT Occupational Therapist OT          Outcome Measures     Row Name 19 1500             How much help from another person do you currently need...    Turning from your back to your side while in flat bed without using bedrails?  1  -ES      Moving from lying on back to sitting on the side of a flat bed without bedrails?  1  -ES      Moving to and from a bed to a chair (including a wheelchair)?  1  -ES      Standing up from a chair using your arms (e.g., wheelchair, bedside chair)?  1  -ES      Climbing 3-5 steps with a railing?  1  -ES      To walk in hospital room?  1  -ES      AM-PAC 6 Clicks Score  6  -ES         Functional Assessment    Outcome Measure Options  AM-PAC 6 Clicks Basic Mobility (PT)  -ES        User  Key  (r) = Recorded By, (t) = Taken By, (c) = Cosigned By    Initials Name Provider Type    Barbara Pedro, PT Physical Therapist          Therapy Suggested Charges     Code   Minutes Charges    None                 OT Discharge Summary  Reason for Discharge: Discharge from facility  Outcomes Achieved: Refer to plan of care for updates on goals achieved(Patient only seen for evaluation prior to discharge.)  Discharge Destination: Inpatient rehabilitation facility      Diamante Gordon OT  3/25/2019

## 2019-12-22 ENCOUNTER — HOSPITAL ENCOUNTER (EMERGENCY)
Facility: HOSPITAL | Age: 84
Discharge: NURSING, REHAB OR LONG-TERM CARE | End: 2019-12-22
Attending: EMERGENCY MEDICINE | Admitting: EMERGENCY MEDICINE

## 2019-12-22 ENCOUNTER — APPOINTMENT (OUTPATIENT)
Dept: GENERAL RADIOLOGY | Facility: HOSPITAL | Age: 84
End: 2019-12-22

## 2019-12-22 VITALS
HEART RATE: 79 BPM | HEIGHT: 72 IN | BODY MASS INDEX: 21.67 KG/M2 | RESPIRATION RATE: 16 BRPM | SYSTOLIC BLOOD PRESSURE: 139 MMHG | TEMPERATURE: 97.6 F | DIASTOLIC BLOOD PRESSURE: 58 MMHG | OXYGEN SATURATION: 94 % | WEIGHT: 160 LBS

## 2019-12-22 DIAGNOSIS — N39.0 ACUTE UTI: Primary | ICD-10-CM

## 2019-12-22 LAB
ALBUMIN SERPL-MCNC: 3.5 G/DL (ref 3.5–5.2)
ALBUMIN/GLOB SERPL: 1 G/DL
ALP SERPL-CCNC: 69 U/L (ref 39–117)
ALT SERPL W P-5'-P-CCNC: 10 U/L (ref 1–41)
ANION GAP SERPL CALCULATED.3IONS-SCNC: 14 MMOL/L (ref 5–15)
AST SERPL-CCNC: 16 U/L (ref 1–40)
B PARAPERT DNA SPEC QL NAA+PROBE: NOT DETECTED
B PERT DNA SPEC QL NAA+PROBE: NOT DETECTED
BACTERIA UR QL AUTO: ABNORMAL /HPF
BASOPHILS # BLD AUTO: 0.03 10*3/MM3 (ref 0–0.2)
BASOPHILS NFR BLD AUTO: 0.4 % (ref 0–1.5)
BILIRUB SERPL-MCNC: 0.5 MG/DL (ref 0.2–1.2)
BILIRUB UR QL STRIP: NEGATIVE
BUN BLD-MCNC: 15 MG/DL (ref 8–23)
BUN/CREAT SERPL: 14.2 (ref 7–25)
C PNEUM DNA NPH QL NAA+NON-PROBE: NOT DETECTED
CALCIUM SPEC-SCNC: 8.9 MG/DL (ref 8.2–9.6)
CHLORIDE SERPL-SCNC: 104 MMOL/L (ref 98–107)
CLARITY UR: ABNORMAL
CO2 SERPL-SCNC: 24 MMOL/L (ref 22–29)
COLOR UR: YELLOW
CREAT BLD-MCNC: 1.06 MG/DL (ref 0.76–1.27)
D-LACTATE SERPL-SCNC: 2.2 MMOL/L (ref 0.5–2)
D-LACTATE SERPL-SCNC: 3 MMOL/L (ref 0.5–2)
DEPRECATED RDW RBC AUTO: 49.1 FL (ref 37–54)
EOSINOPHIL # BLD AUTO: 0.38 10*3/MM3 (ref 0–0.4)
EOSINOPHIL NFR BLD AUTO: 5.1 % (ref 0.3–6.2)
ERYTHROCYTE [DISTWIDTH] IN BLOOD BY AUTOMATED COUNT: 13.9 % (ref 12.3–15.4)
FLUAV H1 2009 PAND RNA NPH QL NAA+PROBE: NOT DETECTED
FLUAV H1 HA GENE NPH QL NAA+PROBE: NOT DETECTED
FLUAV H3 RNA NPH QL NAA+PROBE: NOT DETECTED
FLUAV SUBTYP SPEC NAA+PROBE: NOT DETECTED
FLUBV RNA ISLT QL NAA+PROBE: NOT DETECTED
GFR SERPL CREATININE-BSD FRML MDRD: 65 ML/MIN/1.73
GLOBULIN UR ELPH-MCNC: 3.4 GM/DL
GLUCOSE BLD-MCNC: 128 MG/DL (ref 65–99)
GLUCOSE UR STRIP-MCNC: NEGATIVE MG/DL
HADV DNA SPEC NAA+PROBE: NOT DETECTED
HCOV 229E RNA SPEC QL NAA+PROBE: NOT DETECTED
HCOV HKU1 RNA SPEC QL NAA+PROBE: NOT DETECTED
HCOV NL63 RNA SPEC QL NAA+PROBE: NOT DETECTED
HCOV OC43 RNA SPEC QL NAA+PROBE: NOT DETECTED
HCT VFR BLD AUTO: 43.9 % (ref 37.5–51)
HGB BLD-MCNC: 13.5 G/DL (ref 13–17.7)
HGB UR QL STRIP.AUTO: ABNORMAL
HMPV RNA NPH QL NAA+NON-PROBE: NOT DETECTED
HOLD SPECIMEN: NORMAL
HPIV1 RNA SPEC QL NAA+PROBE: NOT DETECTED
HPIV2 RNA SPEC QL NAA+PROBE: NOT DETECTED
HPIV3 RNA NPH QL NAA+PROBE: NOT DETECTED
HPIV4 P GENE NPH QL NAA+PROBE: NOT DETECTED
HYALINE CASTS UR QL AUTO: ABNORMAL /LPF
IMM GRANULOCYTES # BLD AUTO: 0.02 10*3/MM3 (ref 0–0.05)
IMM GRANULOCYTES NFR BLD AUTO: 0.3 % (ref 0–0.5)
KETONES UR QL STRIP: NEGATIVE
LEUKOCYTE ESTERASE UR QL STRIP.AUTO: ABNORMAL
LYMPHOCYTES # BLD AUTO: 2.39 10*3/MM3 (ref 0.7–3.1)
LYMPHOCYTES NFR BLD AUTO: 32 % (ref 19.6–45.3)
M PNEUMO IGG SER IA-ACNC: NOT DETECTED
MCH RBC QN AUTO: 29.4 PG (ref 26.6–33)
MCHC RBC AUTO-ENTMCNC: 30.8 G/DL (ref 31.5–35.7)
MCV RBC AUTO: 95.6 FL (ref 79–97)
MONOCYTES # BLD AUTO: 0.67 10*3/MM3 (ref 0.1–0.9)
MONOCYTES NFR BLD AUTO: 9 % (ref 5–12)
NEUTROPHILS # BLD AUTO: 3.98 10*3/MM3 (ref 1.7–7)
NEUTROPHILS NFR BLD AUTO: 53.2 % (ref 42.7–76)
NITRITE UR QL STRIP: POSITIVE
NRBC BLD AUTO-RTO: 0 /100 WBC (ref 0–0.2)
NT-PROBNP SERPL-MCNC: 904.5 PG/ML (ref 5–1800)
PH UR STRIP.AUTO: 5.5 [PH] (ref 5–8)
PLATELET # BLD AUTO: 202 10*3/MM3 (ref 140–450)
PMV BLD AUTO: 10.4 FL (ref 6–12)
POTASSIUM BLD-SCNC: 3.9 MMOL/L (ref 3.5–5.2)
PROCALCITONIN SERPL-MCNC: 0.09 NG/ML (ref 0.1–0.25)
PROT SERPL-MCNC: 6.9 G/DL (ref 6–8.5)
PROT UR QL STRIP: ABNORMAL
RBC # BLD AUTO: 4.59 10*6/MM3 (ref 4.14–5.8)
RBC # UR: ABNORMAL /HPF
REF LAB TEST METHOD: ABNORMAL
RHINOVIRUS RNA SPEC NAA+PROBE: NOT DETECTED
RSV RNA NPH QL NAA+NON-PROBE: NOT DETECTED
SODIUM BLD-SCNC: 142 MMOL/L (ref 136–145)
SP GR UR STRIP: 1.01 (ref 1–1.03)
SQUAMOUS #/AREA URNS HPF: ABNORMAL /HPF
TROPONIN T SERPL-MCNC: 0.02 NG/ML (ref 0–0.03)
UROBILINOGEN UR QL STRIP: ABNORMAL
WBC NRBC COR # BLD: 7.47 10*3/MM3 (ref 3.4–10.8)
WBC UR QL AUTO: ABNORMAL /HPF

## 2019-12-22 PROCEDURE — 83605 ASSAY OF LACTIC ACID: CPT | Performed by: EMERGENCY MEDICINE

## 2019-12-22 PROCEDURE — 0100U HC BIOFIRE FILMARRAY RESP PANEL 2: CPT | Performed by: EMERGENCY MEDICINE

## 2019-12-22 PROCEDURE — 96365 THER/PROPH/DIAG IV INF INIT: CPT

## 2019-12-22 PROCEDURE — 84145 PROCALCITONIN (PCT): CPT | Performed by: EMERGENCY MEDICINE

## 2019-12-22 PROCEDURE — 87086 URINE CULTURE/COLONY COUNT: CPT | Performed by: EMERGENCY MEDICINE

## 2019-12-22 PROCEDURE — 71045 X-RAY EXAM CHEST 1 VIEW: CPT

## 2019-12-22 PROCEDURE — 80053 COMPREHEN METABOLIC PANEL: CPT | Performed by: EMERGENCY MEDICINE

## 2019-12-22 PROCEDURE — 93005 ELECTROCARDIOGRAM TRACING: CPT | Performed by: EMERGENCY MEDICINE

## 2019-12-22 PROCEDURE — 81001 URINALYSIS AUTO W/SCOPE: CPT | Performed by: EMERGENCY MEDICINE

## 2019-12-22 PROCEDURE — 87186 SC STD MICRODIL/AGAR DIL: CPT | Performed by: EMERGENCY MEDICINE

## 2019-12-22 PROCEDURE — 84484 ASSAY OF TROPONIN QUANT: CPT | Performed by: EMERGENCY MEDICINE

## 2019-12-22 PROCEDURE — 85025 COMPLETE CBC W/AUTO DIFF WBC: CPT | Performed by: EMERGENCY MEDICINE

## 2019-12-22 PROCEDURE — 99284 EMERGENCY DEPT VISIT MOD MDM: CPT

## 2019-12-22 PROCEDURE — 87040 BLOOD CULTURE FOR BACTERIA: CPT | Performed by: EMERGENCY MEDICINE

## 2019-12-22 PROCEDURE — 83880 ASSAY OF NATRIURETIC PEPTIDE: CPT | Performed by: EMERGENCY MEDICINE

## 2019-12-22 PROCEDURE — 87077 CULTURE AEROBIC IDENTIFY: CPT | Performed by: EMERGENCY MEDICINE

## 2019-12-22 PROCEDURE — 25010000002 CEFTRIAXONE PER 250 MG: Performed by: EMERGENCY MEDICINE

## 2019-12-22 RX ORDER — CEFDINIR 300 MG/1
300 CAPSULE ORAL 2 TIMES DAILY
Qty: 14 CAPSULE | Refills: 0 | Status: SHIPPED | OUTPATIENT
Start: 2019-12-22

## 2019-12-22 RX ORDER — SODIUM CHLORIDE 0.9 % (FLUSH) 0.9 %
10 SYRINGE (ML) INJECTION AS NEEDED
Status: DISCONTINUED | OUTPATIENT
Start: 2019-12-22 | End: 2019-12-22 | Stop reason: HOSPADM

## 2019-12-22 RX ADMIN — CEFTRIAXONE 1 G: 1 INJECTION, POWDER, FOR SOLUTION INTRAMUSCULAR; INTRAVENOUS at 15:26

## 2019-12-22 NOTE — ED PROVIDER NOTES
Nafisa Flores is a 89 y.o. male who presents to the ED with c/o cough. The patient is a resident at [facility] and the staff reports that the patient was complaining of a cough and had an oxygen saturation in the 60's. EMS reportedly brought the patient's oxygen saturation up to 96% prior to arrival using a 3L face mask. The patient's oxygen saturation remained at 96% on room air for EMS. He has a history of severe dementia which limits history. The patient is a former smoker.               History provided by:  EMS personnel  History limited by:  Acuity of condition  Cough   Severity:  Moderate  Onset quality:  Sudden  Timing:  Constant  Progression:  Improving  Chronicity:  New  Associated symptoms: shortness of breath        Review of Systems   Unable to perform ROS: Acuity of condition   Respiratory: Positive for cough and shortness of breath.        Past Medical History:   Diagnosis Date   • Alzheimer disease (CMS/HCC)    • Atherosclerosis of coronary artery bypass graft    • CAD (coronary artery disease)    • Cerebral infarction (CMS/HCC)    • Dementia (CMS/HCC)    • Hyperlipidemia    • Hypertension    • Stroke (CMS/HCC)     DATA DEFICIT        No Known Allergies    Past Surgical History:   Procedure Laterality Date   • CORONARY ARTERY BYPASS GRAFT     • HIP HEMIARTHROPLASTY Left 2/20/2019    Procedure: HIP HEMIARTHROPLASTY LEFT;  Surgeon: Mina Baez MD;  Location: [facility];  Service: Orthopedics       Family History   Family history unknown: Yes       Social History     Socioeconomic History   • Marital status:      Spouse name: Not on file   • Number of children: Not on file   • Years of education: Not on file   • Highest education level: Not on file   Tobacco Use   • Smoking status: Former Smoker     Packs/day: 2.00   • Smokeless tobacco: Never Used   Substance and Sexual Activity   • Alcohol use: No   • Drug use: No   • Sexual activity: Defer   Social History  Narrative    Pt lives at [facility].  Advanced alz dementia.  Oriented to self only at baseline          Objective   Physical Exam   Constitutional: He is oriented to person, place, and time. He appears well-developed and well-nourished.   The patient is confused.   HENT:   Head: Normocephalic and atraumatic.   Nose: Nose normal.   Eyes: Conjunctivae are normal. No scleral icterus.   Neck: Normal range of motion. Neck supple.   Cardiovascular: Normal rate, regular rhythm and normal heart sounds.   No murmur heard.  Pulmonary/Chest:   Coarse bilateral breath sounds.   Abdominal: Soft. Bowel sounds are normal. There is no tenderness. There is no rebound and no guarding.   Musculoskeletal: Normal range of motion.   Neurological: He is alert and oriented to person, place, and time.   Skin: Skin is warm and dry.   Psychiatric: He has a normal mood and affect. His behavior is normal.   Nursing note and vitals reviewed.      Procedures         ED Course       CXR negative.  Flu negative.  Labs benign.  Room air, comfortable, no respiratory symptoms here.  I haven't even seen him cough.  UA is positive for infection.    Pt's son arrived, says pt is at baseline, said he suspected UTI based on what NH told him.    Pt here for several hours, at reported baseline, with no hypoxemia or hypotension or other evidence of critical illness on multiple rechecks.  IV abx given and will send back to NH.              MDM  Number of Diagnoses or Management Options  Acute UTI:      Amount and/or Complexity of Data Reviewed  Clinical lab tests: reviewed and ordered  Tests in the radiology section of CPT®: reviewed and ordered  Decide to obtain previous medical records or to obtain history from someone other than the patient: yes  Obtain history from someone other than the patient: yes  Independent visualization of images, tracings, or specimens: yes        Final diagnoses:   Acute UTI       Documentation assistance  provided by efe Mari.  Information recorded by the scribjose was done at my direction and has been verified and validated by me.     Shannon Mari  12/22/19 1229       Shannon Mari  12/22/19 1540       Quentin Harmon MD  12/22/19 2017

## 2019-12-24 LAB — BACTERIA SPEC AEROBE CULT: ABNORMAL

## 2019-12-27 LAB
BACTERIA SPEC AEROBE CULT: NORMAL
BACTERIA SPEC AEROBE CULT: NORMAL

## 2024-05-14 NOTE — THERAPY EVALUATION
Acute Care - Physical Therapy Initial Evaluation  [facility]     Patient Name: Tee Flores  : 1927  MRN: [id number]  Today's Date: 3/22/2019   Onset of Illness/Injury or Date of Surgery: 19  Date of Referral to PT: 19  Referring Physician: AYE Garrison      Admit Date: 3/21/2019    Visit Dx:     ICD-10-CM ICD-9-CM   1. Symptomatic anemia D64.9 285.9   2. Impaired mobility and ADLs Z74.09 799.89   3. Impaired functional mobility, balance, gait, and endurance Z74.09 V49.89     Patient Active Problem List   Diagnosis   • Closed left hip fracture (CMS/HCC)   • Dementia   • Hypertension   • Hyperlipidemia   • CAD (coronary artery disease)   • Symptomatic anemia   • Hypotension     Past Medical History:   Diagnosis Date   • Alzheimer disease    • Atherosclerosis of coronary artery bypass graft    • CAD (coronary artery disease)    • Cerebral infarction (CMS/HCC)    • Dementia    • Hyperlipidemia    • Hypertension    • Stroke (CMS/HCC)     DATA DEFICIT      Past Surgical History:   Procedure Laterality Date   • CORONARY ARTERY BYPASS GRAFT     • HIP HEMIARTHROPLASTY Left 2019    Procedure: HIP HEMIARTHROPLASTY LEFT;  Surgeon: Mina Baez MD;  Location: [facility];  Service: Orthopedics        PT ASSESSMENT (last 12 hours)      Physical Therapy Evaluation     Row Name 19 1500          PT Evaluation Time/Intention    Subjective Information  no complaints  -ES     Document Type  evaluation  -ES     Mode of Treatment  physical therapy  -ES     Patient Effort  poor  -ES     Comment  RN requested bed level eval  -ES     Row Name 19 1500          General Information    Patient Profile Reviewed?  yes  -ES     Onset of Illness/Injury or Date of Surgery  19  -ES     Referring Physician  AYE Garrison  -ES     Patient Observations  lethargic;agree to therapy  -ES     Patient/Family Observations  No family present  -ES     Prior Level of Function  -- unable to  determine  -ES     Equipment Currently Used at Home  -- unable to determine  -ES     Pertinent History of Current Functional Problem  Pt admitted with anemia.  S/p L hip hemiarthroplasty on 2/20/2019  -ES     Existing Precautions/Restrictions  fall;left;hip L hip hemiarthroplasty 2/20  -ES     Risks Reviewed  patient:;increased discomfort;change in vital signs;lines disloged  -ES     Benefits Reviewed  patient:;improve function;increase independence;increase strength;decrease pain;improve skin integrity;increase knowledge  -ES     Barriers to Rehab  previous functional deficit;cognitive status  -ES     Row Name 03/22/19 1500          Relationship/Environment    Lives With  -- facility resident  -ES     Row Name 03/22/19 1500          Resource/Environmental Concerns    Current Living Arrangements  residential facility [facility]  -ES     Row Name 03/22/19 1500          Cognitive Assessment/Intervention- PT/OT    Affect/Mental Status (Cognitive)  confused;low arousal/lethargic  -ES     Orientation Status (Cognition)  oriented to;person;disoriented to;place;situation;time  -ES     Follows Commands (Cognition)  follows one step commands;0-24% accuracy;delayed response/completion;increased processing time needed;physical/tactile prompts required;repetition of directions required;verbal cues/prompting required  -ES     Safety Deficit (Cognitive)  severe deficit;ability to follow commands;insight into deficits/self awareness;safety precautions awareness;safety precautions follow-through/compliance;problem solving;judgment  -ES     Personal Safety Interventions  fall prevention program maintained;muscle strengthening facilitated  -ES     Row Name 03/22/19 1500          Mobility Assessment/Treatment    Extremity Weight-bearing Status  left lower extremity  -ES     Left Lower Extremity (Weight-bearing Status)  weight-bearing as tolerated (WBAT)  -ES     Row Name 03/22/19 1500          Transfer  Assessment/Treatment    Comment (Transfers)  Deferred  -ES     Row Name 03/22/19 1500          Gait/Stairs Assessment/Training    Comment (Gait/Stairs)  Deferred, not appropriate to assess  -ES     Row Name 03/22/19 1500          General ROM    GENERAL ROM COMMENTS  LLE 0-25% full ROM, RLE 50-75% full ROM muscle guarding with PROM, difficult to assess AROM  -ES     Row Name 03/22/19 1500          MMT (Manual Muscle Testing)    General MMT Comments  unable to formally assess, pt actively flexing R hip and knee against gravity  -ES     Row Name 03/22/19 1500          Sensory Assessment/Intervention    Sensory General Assessment  -- unable to determine  -ES     Row Name 03/22/19 1500          Pain Scale: Numbers Pre/Post-Treatment    Pain Location - Side  Left  -ES     Pain Location - Orientation  lower  -ES     Pain Location  extremity  -ES     Row Name 03/22/19 1500          Pain Scale: FACES Pre/Post-Treatment    Pain: FACES Scale, Pretreatment  0-->no hurt  -ES     Pain: FACES Scale, Post-Treatment  0-->no hurt  -ES     Row Name             Wound 03/21/19 2100 Right heel pressure injury    Wound - Properties Group Date first assessed: 03/21/19  -LM Time first assessed: 2100  -LM Present On Admission : yes  -LM Side: Right  -LM Location: heel  -LM Type: pressure injury  -LM Stage, Pressure Injury: Stage 2  -LM    Row Name             Wound 03/21/19 2100 Right anterior;lateral foot unspecified    Wound - Properties Group Date first assessed: 03/21/19  -LM Time first assessed: 2100  -LM Present On Admission : yes  -LM Side: Right  -LM Orientation: anterior;lateral  -LM Location: foot  -LM Type: unspecified  -LM Stage, Pressure Injury: Stage 2  -LM    Row Name             Wound 03/21/19 2100 Bilateral groin MASD (moisture associated skin damage)    Wound - Properties Group Date first assessed: 03/21/19  -LM Time first assessed: 2100  -LM Present On Admission : yes  -LM Side: Bilateral  -LM Location: groin  -LM Type:  MASD (moisture associated skin damage)  -LM Stage, Pressure Injury: Stage 1  -LM    Row Name             Wound 03/21/19 2100 Bilateral coccyx MASD (moisture associated skin damage)    Wound - Properties Group Date first assessed: 03/21/19  -LM Time first assessed: 2100  -LM Present On Admission : yes  -LM Side: Bilateral  -LM Location: coccyx  -LM Type: MASD (moisture associated skin damage)  -LM Stage, Pressure Injury: Stage 1  -LM    Row Name 03/22/19 1500          Plan of Care Review    Plan of Care Reviewed With  patient  -ES     Row Name 03/22/19 1500          Physical Therapy Clinical Impression    Date of Referral to PT  03/21/19  -ES     PT Diagnosis (PT Clinical Impression)  impaired functional mobility, decreases ROM, weakness  -ES     Patient/Family Goals Statement (PT Clinical Impression)  unable to state  -ES     Criteria for Skilled Interventions Met (PT Clinical Impression)  yes;treatment indicated  -ES     Pathology/Pathophysiology Noted (Describe Specifically for Each System)  musculoskeletal  -ES     Impairments Found (describe specific impairments)  gait, locomotion, and balance;muscle performance;ROM  -ES     Functional Limitations in Following Categories (Describe Specific Limitations)  self-care  -ES     Rehab Potential (PT Clinical Summary)  fair, will monitor progress closely  -ES     Predicted Duration of Therapy (PT)  2 weeks  -ES     Care Plan Review (PT)  evaluation/treatment results reviewed;care plan/treatment goals reviewed;risks/benefits reviewed;patient/other agree to care plan  -ES     Row Name 03/22/19 1500          Vital Signs    Pre Systolic BP Rehab  -- VSS throughout, RN cleared for tx  -ES     Row Name 03/22/19 1500          Physical Therapy Goals    Bed Mobility Goal Selection (PT)  bed mobility, PT goal 1  -ES     Transfer Goal Selection (PT)  transfer, PT goal 1  -ES     Gait Training Goal Selection (PT)  gait training, PT goal 1  -ES     Row Name 03/22/19 1500          Bed  Mobility Goal 1 (PT)    Activity/Assistive Device (Bed Mobility Goal 1, PT)  rolling to left;rolling to right;sit to supine;supine to sit  -ES     [place] Level/Cues Needed (Bed Mobility Goal 1, PT)  moderate assist (50-74% patient effort);verbal cues required  -ES     Time Frame (Bed Mobility Goal 1, PT)  2 weeks  -ES     Progress/Outcomes (Bed Mobility Goal 1, PT)  goal ongoing  -ES     Row Name 03/22/19 1500          Transfer Goal 1 (PT)    Activity/Assistive Device (Transfer Goal 1, PT)  sit-to-stand/stand-to-sit;bed-to-chair/chair-to-bed;walker, rolling  -ES     [place] Level/Cues Needed (Transfer Goal 1, PT)  moderate assist (50-74% patient effort);verbal cues required  -ES     Time Frame (Transfer Goal 1, PT)  2 weeks  -ES     Progress/Outcome (Transfer Goal 1, PT)  goal ongoing  -ES     Row Name 03/22/19 1500          Gait Training Goal 1 (PT)    Activity/Assistive Device (Gait Training Goal 1, PT)  gait (walking locomotion);walker, rolling  -ES     [place] Level (Gait Training Goal 1, PT)  minimum assist (75% or more patient effort)  -ES     Distance (Gait Goal 1, PT)  20  -ES     Time Frame (Gait Training Goal 1, PT)  2 weeks  -ES     Progress/Outcome (Gait Training Goal 1, PT)  goal ongoing  -ES     Row Name 03/22/19 1500          Positioning and Restraints    Pre-Treatment Position  in bed  -ES     Post Treatment Position  bed  -ES     In Bed  supine;call light within reach;encouraged to call for assist;exit alarm on;waffle boots/both  -ES     Row Name 03/22/19 1500          Living Environment    Home Accessibility  wheelchair accessible  -ES       User Key  (r) = Recorded By, (t) = Taken By, (c) = Cosigned By    Initials Name Provider Type    Eliana Mcdowell, RN Registered Nurse    Barbara Pedro, PT Physical Therapist        Physical Therapy Education     Title: PT OT SLP Therapies (In Progress)     Topic: Physical Therapy (In Progress)     Point: Home exercise program (In  Progress)     Learning Progress Summary           Patient Acceptance, E, NR by DARREN at 3/22/2019  4:01 PM                   Point: Precautions (In Progress)     Learning Progress Summary           Patient Acceptance, E, NR by DARREN at 3/22/2019  4:01 PM                               User Key     Initials Effective Dates Name Provider Type Discipline    DARREN 11/13/17 -  Barbara Nugent, PT Physical Therapist PT              PT Recommendation and Plan  Anticipated Discharge Disposition (PT): skilled nursing facility  Planned Therapy Interventions (PT Eval): balance training, bed mobility training, gait training, home exercise program, neuromuscular re-education, patient/family education, ROM (range of motion), stair training, strengthening, transfer training  Therapy Frequency (PT Clinical Impression): 3 times/wk  Outcome Summary/Treatment Plan (PT)  Anticipated Equipment Needs at Discharge (PT): (TBD pending pt progress)  Anticipated Discharge Disposition (PT): skilled nursing facility  Plan of Care Reviewed With: patient  Outcome Summary: PT initial eval completed.  Pt exhibits decreased active and passive ROM of LEs and muscle weakness.  Pt limited by decreased command following.  PT will attempt to follow 3x per week based on pt participation.  Recommend SNF at d/c  Outcome Measures     Row Name 03/22/19 1500 03/22/19 0825          How much help from another person do you currently need...    Turning from your back to your side while in flat bed without using bedrails?  1  -ES  --     Moving from lying on back to sitting on the side of a flat bed without bedrails?  1  -ES  --     Moving to and from a bed to a chair (including a wheelchair)?  1  -ES  --     Standing up from a chair using your arms (e.g., wheelchair, bedside chair)?  1  -ES  --     Climbing 3-5 steps with a railing?  1  -ES  --     To walk in hospital room?  1  -ES  --     AM-PAC 6 Clicks Score  6  -ES  --        How much help from another is currently  needed...    Putting on and taking off regular lower body clothing?  --  1  -AC     Bathing (including washing, rinsing, and drying)  --  1  -AC     Toileting (which includes using toilet bed pan or urinal)  --  1  -AC     Putting on and taking off regular upper body clothing  --  1  -AC     Taking care of personal grooming (such as brushing teeth)  --  1  -AC     Eating meals  --  2  -AC     Score  --  7  -AC        Functional Assessment    Outcome Measure Options  AM-PAC 6 Clicks Basic Mobility (PT)  -ES  AM-PAC 6 Clicks Daily Activity (OT)  -AC       User Key  (r) = Recorded By, (t) = Taken By, (c) = Cosigned By    Initials Name Provider Type    AC Denise Flor, OT Occupational Therapist    ES Barbara Nugent, PT Physical Therapist         Time Calculation:   PT Charges     Row Name 03/22/19 1601             Time Calculation    Start Time  1500  -ES      PT Received On  03/22/19  -ES      PT Goal Re-Cert Due Date  04/01/19  -ES        User Key  (r) = Recorded By, (t) = Taken By, (c) = Cosigned By    Initials Name Provider Type    Barbara Pedro, PT Physical Therapist        Therapy Charges for Today     Code Description Service Date Service Provider Modifiers Qty    [id number] HC PT EVAL MOD COMPLEXITY 3 3/22/2019 Barbara Nugent, PT GP 1          PT G-Codes  Outcome Measure Options: AM-PAC 6 Clicks Basic Mobility (PT)  AM-PAC 6 Clicks Score: 6  Score: 7      Barbara Nugent PT  3/22/2019        97.1

## (undated) DEVICE — ANTIBACTERIAL UNDYED BRAIDED (POLYGLACTIN 910), SYNTHETIC ABSORBABLE SUTURE: Brand: COATED VICRYL

## (undated) DEVICE — PK HIP TOTL UNIV 10

## (undated) DEVICE — MEDI-VAC YANKAUER SUCTION HANDLE W/BULBOUS TIP: Brand: CARDINAL HEALTH

## (undated) DEVICE — ENCORE® LATEX MICRO SIZE 7, STERILE LATEX POWDER-FREE SURGICAL GLOVE: Brand: ENCORE

## (undated) DEVICE — GLV SURG SIGNATURE TOUCH PF LTX 8 STRL BX/50

## (undated) DEVICE — ENCORE® LATEX MICRO SIZE 6.5, STERILE LATEX POWDER-FREE SURGICAL GLOVE: Brand: ENCORE

## (undated) DEVICE — SKIN AFFIX SURG ADHESIVE 72/CS 0.55ML: Brand: MEDLINE

## (undated) DEVICE — T4 PULLOVER TOGA, LARGE

## (undated) DEVICE — DRSNG SURG AQUACEL AG 9X25CM

## (undated) DEVICE — GLV SURG TRIUMPH CLASSIC PF LTX 7.5 STRL